# Patient Record
Sex: FEMALE | Race: OTHER | Employment: UNEMPLOYED | URBAN - METROPOLITAN AREA
[De-identification: names, ages, dates, MRNs, and addresses within clinical notes are randomized per-mention and may not be internally consistent; named-entity substitution may affect disease eponyms.]

---

## 2017-04-03 ENCOUNTER — OFFICE VISIT (OUTPATIENT)
Dept: INTERNAL MEDICINE CLINIC | Facility: CLINIC | Age: 62
End: 2017-04-03

## 2017-04-03 VITALS
SYSTOLIC BLOOD PRESSURE: 147 MMHG | DIASTOLIC BLOOD PRESSURE: 74 MMHG | HEART RATE: 73 BPM | WEIGHT: 135.69 LBS | TEMPERATURE: 99 F

## 2017-04-03 DIAGNOSIS — R18.8 CIRRHOSIS OF LIVER WITH ASCITES, UNSPECIFIED HEPATIC CIRRHOSIS TYPE (HCC): Primary | ICD-10-CM

## 2017-04-03 DIAGNOSIS — E55.9 VITAMIN D DEFICIENCY: ICD-10-CM

## 2017-04-03 DIAGNOSIS — E11.9 CONTROLLED TYPE 2 DIABETES MELLITUS WITHOUT COMPLICATION, WITHOUT LONG-TERM CURRENT USE OF INSULIN (HCC): ICD-10-CM

## 2017-04-03 DIAGNOSIS — K74.60 CIRRHOSIS OF LIVER WITH ASCITES, UNSPECIFIED HEPATIC CIRRHOSIS TYPE (HCC): Primary | ICD-10-CM

## 2017-04-03 PROCEDURE — 99204 OFFICE O/P NEW MOD 45 MIN: CPT | Performed by: INTERNAL MEDICINE

## 2017-04-03 PROCEDURE — 99212 OFFICE O/P EST SF 10 MIN: CPT | Performed by: INTERNAL MEDICINE

## 2017-04-03 RX ORDER — POLYETHYLENE GLYCOL 3350 17 G/17G
17 POWDER, FOR SOLUTION ORAL DAILY
COMMUNITY
End: 2017-05-16

## 2017-04-03 RX ORDER — FUROSEMIDE 20 MG/1
20 TABLET ORAL DAILY
COMMUNITY
End: 2017-04-11

## 2017-04-03 RX ORDER — SPIRONOLACTONE 25 MG/1
25 TABLET ORAL DAILY
COMMUNITY
End: 2017-04-11

## 2017-04-03 RX ORDER — CHOLECALCIFEROL (VITAMIN D3) 1250 MCG
CAPSULE ORAL
COMMUNITY
End: 2017-05-16 | Stop reason: ALTCHOICE

## 2017-04-03 NOTE — PROGRESS NOTES
HPI:    Patient ID: Kim Uribe is a 57year old female who arrives today with her daughter. The patient was seen at [facility] on 03/28/2017 for abdominal distention due to cirrhosis of the liver. PMHx includes Vitamin D Deficiency.    HPI  Diabetes well-developed. No distress. HENT:   Head: Normocephalic and atraumatic. Eyes: Conjunctivae are normal.   Neck: Normal range of motion. Carotid bruit is not present. Cardiovascular: Normal rate, regular rhythm and normal heart sounds.   Exam reveals n importance doing so. They verbalized agreement. - Differential Diagnosis: It is possible that the patient could have Primary Biliary Cirrhosis (PBC). Need to rule out ovarian carcinoma as a cause as well. - GASTRO - INTERNAL. An appointment was schedule 4/3/2017, 2:21 PM

## 2017-04-05 ENCOUNTER — TELEPHONE (OUTPATIENT)
Dept: INTERNAL MEDICINE CLINIC | Facility: CLINIC | Age: 62
End: 2017-04-05

## 2017-04-05 DIAGNOSIS — K74.60 CIRRHOSIS OF LIVER WITH ASCITES, UNSPECIFIED HEPATIC CIRRHOSIS TYPE (HCC): ICD-10-CM

## 2017-04-05 DIAGNOSIS — R18.8 CIRRHOSIS OF LIVER WITH ASCITES, UNSPECIFIED HEPATIC CIRRHOSIS TYPE (HCC): ICD-10-CM

## 2017-04-05 DIAGNOSIS — R18.8 OTHER ASCITES: Primary | ICD-10-CM

## 2017-04-06 NOTE — TELEPHONE ENCOUNTER
I spoke to Izabela Carrillo at Dr Lorri Whalen' office [phone]--OBI states Dr saw as inpatient, so transferred me to Med Records [phone]. Jaycob Parikh requests that I fax face sheet with info needed. This was done --fax [phone].  She states it will be sent to the

## 2017-04-06 NOTE — TELEPHONE ENCOUNTER
Called pt using language line, let pt's daughter know referral was placed to see Dr Rodrick Menjivar and for pt to follow up.

## 2017-04-06 NOTE — TELEPHONE ENCOUNTER
Please get care everywhere consent. I have never seen her before. Please schedule consult, next available.

## 2017-04-06 NOTE — TELEPHONE ENCOUNTER
I called Dr. Nathalie Corona and he discussed with me the findings of this patient and recommendations. I asked him if he can send the information in the notes from [place] to to us through the epic and he stated that his nerves going to be doing it today.

## 2017-04-07 ENCOUNTER — OFFICE VISIT (OUTPATIENT)
Dept: INTERNAL MEDICINE CLINIC | Facility: CLINIC | Age: 62
End: 2017-04-07

## 2017-04-07 VITALS
TEMPERATURE: 98 F | HEART RATE: 64 BPM | SYSTOLIC BLOOD PRESSURE: 155 MMHG | WEIGHT: 138 LBS | DIASTOLIC BLOOD PRESSURE: 71 MMHG

## 2017-04-07 DIAGNOSIS — R76.12 POSITIVE QUANTIFERON-TB GOLD TEST: ICD-10-CM

## 2017-04-07 DIAGNOSIS — K74.60 CIRRHOSIS OF LIVER WITH ASCITES, UNSPECIFIED HEPATIC CIRRHOSIS TYPE (HCC): Primary | ICD-10-CM

## 2017-04-07 DIAGNOSIS — R18.8 CIRRHOSIS OF LIVER WITH ASCITES, UNSPECIFIED HEPATIC CIRRHOSIS TYPE (HCC): Primary | ICD-10-CM

## 2017-04-07 PROCEDURE — 99214 OFFICE O/P EST MOD 30 MIN: CPT | Performed by: INTERNAL MEDICINE

## 2017-04-07 PROCEDURE — 99212 OFFICE O/P EST SF 10 MIN: CPT | Performed by: INTERNAL MEDICINE

## 2017-04-07 NOTE — PROGRESS NOTES
HPI:    Patient ID: Marilyn Munoz is a 57year old female who arrives today with her daughter. 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 HPI  Cirrhosis  This is a new problem.  The current episode started 1 to 4 week guarding. Musculoskeletal: Normal range of motion. She exhibits no edema (negative for edema in the bilateral lower extremities). Neurological: She is alert and oriented to person, place, and time. Skin: She is not diaphoretic.    Psychiatric: Her beh registration and records. Stressed the importance that the patient be evaluated by GASTRO with urgency, and to keep appointment date. They verbalized understanding.  - Not to take acetaminophen or NSAID for symptom relief.  Only 325 MG of acetaminophen if n

## 2017-04-11 ENCOUNTER — TELEPHONE (OUTPATIENT)
Dept: INTERNAL MEDICINE CLINIC | Facility: CLINIC | Age: 62
End: 2017-04-11

## 2017-04-11 ENCOUNTER — OFFICE VISIT (OUTPATIENT)
Dept: GASTROENTEROLOGY | Facility: CLINIC | Age: 62
End: 2017-04-11

## 2017-04-11 VITALS
HEART RATE: 64 BPM | SYSTOLIC BLOOD PRESSURE: 147 MMHG | BODY MASS INDEX: 26.5 KG/M2 | WEIGHT: 135 LBS | HEIGHT: 60 IN | DIASTOLIC BLOOD PRESSURE: 67 MMHG

## 2017-04-11 DIAGNOSIS — K76.9 CHRONIC LIVER DISEASE AND CIRRHOSIS (HCC): Primary | ICD-10-CM

## 2017-04-11 DIAGNOSIS — K74.60 CIRRHOSIS OF LIVER NOT DUE TO ALCOHOL (HCC): Primary | ICD-10-CM

## 2017-04-11 DIAGNOSIS — R76.12 POSITIVE QUANTIFERON-TB GOLD TEST: ICD-10-CM

## 2017-04-11 DIAGNOSIS — K65.2 SBP (SPONTANEOUS BACTERIAL PERITONITIS) (HCC): ICD-10-CM

## 2017-04-11 DIAGNOSIS — R05.9 COUGH: ICD-10-CM

## 2017-04-11 DIAGNOSIS — R76.11 POSITIVE TB TEST: ICD-10-CM

## 2017-04-11 DIAGNOSIS — K74.60 CHRONIC LIVER DISEASE AND CIRRHOSIS (HCC): Primary | ICD-10-CM

## 2017-04-11 PROCEDURE — 99244 OFF/OP CNSLTJ NEW/EST MOD 40: CPT | Performed by: INTERNAL MEDICINE

## 2017-04-11 PROCEDURE — 99212 OFFICE O/P EST SF 10 MIN: CPT | Performed by: INTERNAL MEDICINE

## 2017-04-11 RX ORDER — SPIRONOLACTONE 25 MG/1
25 TABLET ORAL DAILY
Qty: 1 TABLET | Refills: 2 | Status: SHIPPED | OUTPATIENT
Start: 2017-04-11 | End: 2017-04-14

## 2017-04-11 RX ORDER — FUROSEMIDE 20 MG/1
20 TABLET ORAL DAILY
Qty: 30 TABLET | Refills: 2 | Status: SHIPPED | OUTPATIENT
Start: 2017-04-11 | End: 2017-04-14

## 2017-04-11 NOTE — TELEPHONE ENCOUNTER
Daughter states that pt had an appt with Dr. Isaak Avery today and she was not able to help them due to pt needing a liver specialist. They were told for them to call her pcp to get a referral to see a liver specialist.  Pt would like to know if doctor can giv

## 2017-04-11 NOTE — PROGRESS NOTES
HPI:    Patient ID: Reina Valencia is a 57year old female. HPI    Review of Systems   Constitutional: Positive for fatigue. Negative for fever, chills, diaphoresis, activity change, appetite change and unexpected weight change.    HENT: Negative for place, and time. She appears well-developed and well-nourished. No distress. HENT:   Head: Normocephalic and atraumatic. Mouth/Throat: Oropharynx is clear and moist. No oropharyngeal exudate.    Eyes: Conjunctivae and EOM are normal. Pupils are equal, r Oral Tab 30 tablet 2      Sig: Take 1 tablet (20 mg total) by mouth daily.            Imaging & Referrals:  None       MB#[id number]

## 2017-04-11 NOTE — PATIENT INSTRUCTIONS
1.  You need to be referred to a liver transplant center disease (where your insurance is accepted). Possibly [address] at [facility]. 2.  Continue present medications including Aldactone and Lasix, low-salt diet    3.   You probably need a bi

## 2017-04-11 NOTE — H&P
History of present illness: This is a 75-year-old female referred from Dr. Aneta Cole of our group as well as from Psychiatric liver department, Dr. Dana Gloria.   I have spoken with Dr. Kei Antunez today to clarify this referral.  The patient was re daughter who is with her today states that new insurance starts May 1. She has no alarm signs or symptoms at this time.   After discussion with Dr. Hiral Larkin of [facility] it is clear that she needs a repeat paracentesis, possible peritoneal biopsy and a transpla

## 2017-04-13 NOTE — H&P
Patient should be referred to liver specialist, Dr. Howard Ferrara,  at [facility] within 1-2 months.

## 2017-04-14 ENCOUNTER — OFFICE VISIT (OUTPATIENT)
Dept: INTERNAL MEDICINE CLINIC | Facility: CLINIC | Age: 62
End: 2017-04-14

## 2017-04-14 VITALS
SYSTOLIC BLOOD PRESSURE: 144 MMHG | HEART RATE: 68 BPM | BODY MASS INDEX: 26 KG/M2 | DIASTOLIC BLOOD PRESSURE: 70 MMHG | TEMPERATURE: 98 F | WEIGHT: 134.38 LBS

## 2017-04-14 DIAGNOSIS — K65.2 SBP (SPONTANEOUS BACTERIAL PERITONITIS) (HCC): ICD-10-CM

## 2017-04-14 DIAGNOSIS — R76.12 POSITIVE QUANTIFERON-TB GOLD TEST: ICD-10-CM

## 2017-04-14 DIAGNOSIS — K74.60 LIVER DISEASE, CHRONIC, WITH CIRRHOSIS (HCC): Primary | ICD-10-CM

## 2017-04-14 DIAGNOSIS — K76.9 LIVER DISEASE, CHRONIC, WITH CIRRHOSIS (HCC): Primary | ICD-10-CM

## 2017-04-14 PROCEDURE — 99212 OFFICE O/P EST SF 10 MIN: CPT | Performed by: INTERNAL MEDICINE

## 2017-04-14 PROCEDURE — 99214 OFFICE O/P EST MOD 30 MIN: CPT | Performed by: INTERNAL MEDICINE

## 2017-04-14 RX ORDER — SPIRONOLACTONE 25 MG/1
25 TABLET ORAL DAILY
Qty: 30 TABLET | Refills: 6 | Status: SHIPPED | OUTPATIENT
Start: 2017-04-14 | End: 2017-05-16

## 2017-04-14 RX ORDER — FUROSEMIDE 20 MG/1
20 TABLET ORAL DAILY
Qty: 30 TABLET | Refills: 6 | Status: SHIPPED | OUTPATIENT
Start: 2017-04-14 | End: 2017-05-16

## 2017-04-14 NOTE — PROGRESS NOTES
HPI:    Patient ID: Ac Hull is a 57year old female. Patient was previously evaluated with cirrhosis at [facility], but could not extend any further treatment there due to insurance issues.  She was referred by me to RICHARD Dalton, who recommended Current Outpatient Prescriptions:  MetFORMIN HCl 500 MG Oral Tab Take 500 mg by mouth 2 (two) times daily with meals. Disp:  Rfl:    spironolactone 25 MG Oral Tab Take 1 tablet (25 mg total) by mouth daily.  Disp: 1 tablet Rfl: 2   furo noticed on today's exam.   Plan:  I discussed with patient that I will refer her to external transplant specialist; however, patient will have her new insurance starting May 1st. At this time, we do not know yet what network her new insurance will cover, a Electronically Signed: Harini Wang, 4/14/2017, 8:07 AM.      I, Marilu Roberts MD,  personally performed the services described in this documentation. All medical record entries made by the scribe were at my direction and in my presence.   I have reviewed t

## 2017-05-01 ENCOUNTER — TELEPHONE (OUTPATIENT)
Dept: PULMONOLOGY | Facility: CLINIC | Age: 62
End: 2017-05-01

## 2017-05-01 NOTE — TELEPHONE ENCOUNTER
Pts daughter asking for pt to be seen sooner than next available, states PCP needs pt to see pulmonary doctor first before he can refer pt to a doctor to treat her liver cirrhosis. Pls advise thank you. French speaker.

## 2017-05-01 NOTE — TELEPHONE ENCOUNTER
Attempted to contact patient via interpretor, unable to leave voicemail. Will try again later today. Per Dr. Tam Hankins note patient is positive Quantifieron TB, had x-rays done at [facility] that she will need to get.  Can try to get patient in with Dr. Leda Kenney

## 2017-05-02 ENCOUNTER — TELEPHONE (OUTPATIENT)
Dept: GASTROENTEROLOGY | Facility: CLINIC | Age: 62
End: 2017-05-02

## 2017-05-02 NOTE — TELEPHONE ENCOUNTER
Per Dr. Yumiko Gaston, pt.'s new insurance is in her chart as [facility] with ID no.  [id number]; this is confirmed with Hugo Forrester in managed care and the pt will not need referrals with this insurance, but she needs to see providers in network; she checked if

## 2017-05-02 NOTE — TELEPHONE ENCOUNTER
Per Kathy Mcintosh RN from  please assist pt per Dr. Gudelia Quintana to see pulmonary MD asap for positive quantiferon gold, pt will be returning from [facility] Saturday 5/6.  Pt daughter Collins Vaz contacted via Michae Schilder #[id number] appt set up with MM 5/15 @ 10 am in [place]

## 2017-05-02 NOTE — TELEPHONE ENCOUNTER
Dr. Rylee Sparks' office is contacted- spoke with Farhat Sanchez; appt is booked for 5/10/17 at 1400; pt will get phone call to confirm appt on Monday; referral needs to be faxed to [phone].

## 2017-05-02 NOTE — TELEPHONE ENCOUNTER
Please send letter via certified mail. [place] coordinate a referral to a tertiary center that except her insurance for cirrhosis, and also possible peritoneal biopsy to rule out peritoneal tuberculosis.

## 2017-05-02 NOTE — TELEPHONE ENCOUNTER
Due to  not being able to dial out, pt.'s daughter is contacted by myself and, in [address], she is made aware of appt with Dr. Lexi Bishop on 5/10/17 at 1400; she is agreeable with this; she is made aware of phone call on Monday from [facility] to confirm ap

## 2017-05-08 NOTE — TELEPHONE ENCOUNTER
Contacted Dr. Edenilson Kent office at [facility] and confirmed pt appt on 5/10/17 @ [facility] states that they already have the pt's insurance information and that they DO accept it.     I contacted pt daughter, Jesus Settler, and she wanted to know if she has 3 appts sc

## 2017-05-08 NOTE — TELEPHONE ENCOUNTER
Pts daughter would like clarification on 5/2 letter. LIBERTY was told by RN she would cb after calling Dr. Terry Godinez office, pts daughter was informed.  Wolof speaker

## 2017-05-16 ENCOUNTER — OFFICE VISIT (OUTPATIENT)
Dept: INTERNAL MEDICINE CLINIC | Facility: CLINIC | Age: 62
End: 2017-05-16

## 2017-05-16 VITALS
TEMPERATURE: 98 F | WEIGHT: 129.13 LBS | SYSTOLIC BLOOD PRESSURE: 143 MMHG | HEART RATE: 59 BPM | DIASTOLIC BLOOD PRESSURE: 70 MMHG | BODY MASS INDEX: 25 KG/M2

## 2017-05-16 DIAGNOSIS — R18.8 CIRRHOSIS OF LIVER WITH ASCITES, UNSPECIFIED HEPATIC CIRRHOSIS TYPE (HCC): Primary | ICD-10-CM

## 2017-05-16 DIAGNOSIS — E11.9 CONTROLLED TYPE 2 DIABETES MELLITUS WITHOUT COMPLICATION, WITHOUT LONG-TERM CURRENT USE OF INSULIN (HCC): ICD-10-CM

## 2017-05-16 DIAGNOSIS — K74.60 CIRRHOSIS OF LIVER WITH ASCITES, UNSPECIFIED HEPATIC CIRRHOSIS TYPE (HCC): Primary | ICD-10-CM

## 2017-05-16 PROCEDURE — 99212 OFFICE O/P EST SF 10 MIN: CPT | Performed by: INTERNAL MEDICINE

## 2017-05-16 PROCEDURE — 99214 OFFICE O/P EST MOD 30 MIN: CPT | Performed by: INTERNAL MEDICINE

## 2017-05-16 RX ORDER — SPIRONOLACTONE 25 MG/1
25 TABLET ORAL DAILY
Qty: 30 TABLET | Refills: 6 | Status: SHIPPED | OUTPATIENT
Start: 2017-05-16 | End: 2017-07-24

## 2017-05-16 RX ORDER — FUROSEMIDE 20 MG/1
20 TABLET ORAL DAILY
Qty: 30 TABLET | Refills: 6 | Status: SHIPPED | OUTPATIENT
Start: 2017-05-16 | End: 2018-04-05 | Stop reason: DRUGHIGH

## 2017-05-16 NOTE — PROGRESS NOTES
HPI:    Patient ID: Bulmaro Florian is a 57year old female. HPI  Cirrhosis  This is a new problem. Episode onset: Less than 2 months ago. The problem occurs constantly. Progression since onset: stable.  Pertinent negatives include no abdominal pain, PHYSICAL EXAM:   Physical Exam   Constitutional: She is oriented to person, place, and time. She appears well-developed. No distress. HENT:   Head: Normocephalic and atraumatic. Eyes: Conjunctivae are normal.   Neck: Normal range of motion.    Cardiov up with Dr. Fanny Jara as scheduled. - GASTRO - EXTERNAL        (E11.9) Controlled type 2 diabetes mellitus without complication, without long-term current use of insulin (HCC)  PMHx includes chronic Diabetes Mellitus Type II.  Current treatment includes MetF

## 2017-05-17 NOTE — TELEPHONE ENCOUNTER
Yesterday  gave patient a meter and told patient that  was going to prescribe the test strips for that meter but pharmacy has't rec'd the rx. Pharmacy information has been verified.      Daughter states she will call back later today with the name of

## 2017-05-23 NOTE — TELEPHONE ENCOUNTER
Language line # [phone]   Assisted with call. Pt was not home and daughter does not know where the meter is. She will check this with her mother and call us back with the meter name.

## 2017-05-30 RX ORDER — SYRING-NEEDL,DISP,INSUL,0.3 ML 30 GX5/16"
SYRINGE, EMPTY DISPOSABLE MISCELLANEOUS
Qty: 100 EACH | Refills: 3 | Status: SHIPPED | OUTPATIENT
Start: 2017-05-30 | End: 2017-09-26

## 2017-07-24 ENCOUNTER — OFFICE VISIT (OUTPATIENT)
Dept: INTERNAL MEDICINE CLINIC | Facility: CLINIC | Age: 62
End: 2017-07-24

## 2017-07-24 VITALS
WEIGHT: 130 LBS | DIASTOLIC BLOOD PRESSURE: 65 MMHG | HEIGHT: 60 IN | BODY MASS INDEX: 25.52 KG/M2 | SYSTOLIC BLOOD PRESSURE: 149 MMHG | HEART RATE: 67 BPM

## 2017-07-24 DIAGNOSIS — R76.12 POSITIVE QUANTIFERON-TB GOLD TEST: ICD-10-CM

## 2017-07-24 DIAGNOSIS — E11.9 CONTROLLED TYPE 2 DIABETES MELLITUS WITHOUT COMPLICATION, WITHOUT LONG-TERM CURRENT USE OF INSULIN (HCC): Primary | ICD-10-CM

## 2017-07-24 DIAGNOSIS — L84 CALLUS OF FOOT: ICD-10-CM

## 2017-07-24 DIAGNOSIS — R18.8 CIRRHOSIS OF LIVER WITH ASCITES, UNSPECIFIED HEPATIC CIRRHOSIS TYPE (HCC): ICD-10-CM

## 2017-07-24 DIAGNOSIS — K74.60 CIRRHOSIS OF LIVER WITH ASCITES, UNSPECIFIED HEPATIC CIRRHOSIS TYPE (HCC): ICD-10-CM

## 2017-07-24 PROCEDURE — 99214 OFFICE O/P EST MOD 30 MIN: CPT | Performed by: INTERNAL MEDICINE

## 2017-07-24 PROCEDURE — 99212 OFFICE O/P EST SF 10 MIN: CPT | Performed by: INTERNAL MEDICINE

## 2017-07-24 RX ORDER — SPIRONOLACTONE 25 MG/1
25 TABLET ORAL DAILY
Qty: 30 TABLET | Refills: 6 | Status: SHIPPED | OUTPATIENT
Start: 2017-07-24 | End: 2018-04-16 | Stop reason: ALTCHOICE

## 2017-07-24 NOTE — PROGRESS NOTES
HPI:    Patient ID: Hector Tuttle is a 57year old female. Diabetes   She presents for her follow-up diabetic visit. She has type 2 diabetes mellitus. Pertinent negatives for hypoglycemia include no speech difficulty.  Pertinent negatives for diabet day.  As instructed. Disp: 100 each Rfl: 3   Lancet Device Does not apply Misc To use as instructed 3 times a day. Disp: 100 each Rfl: 3   furosemide 20 MG Oral Tab Take 1 tablet (20 mg total) by mouth daily.  Disp: 30 tablet Rfl: 6     Allergies:No Known A LIPID PANEL, COMP METABOLIC         PANEL (14), MICROALB/CREAT RATIO, RANDOM URINE ordered   -Refill for metformin hcl 500  Mg prescribed      (K74.60) Cirrhosis of liver with ascites, unspecified hepatic cirrhosis type ([facility] [address]  - The patient denies current reviewed the chart and discharge instructions (if applicable) and agree that the record reflects my personal performance and is accurate and complete.   Helga Guerrero MD, 7/24/2017, 10:00 AM

## 2017-09-28 RX ORDER — LANCETS
EACH MISCELLANEOUS
Qty: 100 EACH | Refills: 3 | Status: SHIPPED | OUTPATIENT
Start: 2017-09-28 | End: 2018-02-04

## 2017-09-28 NOTE — TELEPHONE ENCOUNTER
Supplies sent. Has yet to do labs.   Diabetes Medications  Protocol Criteria:  · Appointment scheduled in the past 6 months or the next 3 months  · A1C < 7.5 in the past 6 months  · Creatinine in the past 12 months  · Creatinine result < 1.5   Recent Outpa

## 2017-10-13 ENCOUNTER — APPOINTMENT (OUTPATIENT)
Dept: LAB | Facility: HOSPITAL | Age: 62
End: 2017-10-13
Attending: INTERNAL MEDICINE
Payer: MEDICAID

## 2017-10-13 DIAGNOSIS — K74.60 CIRRHOSIS OF LIVER WITH ASCITES, UNSPECIFIED HEPATIC CIRRHOSIS TYPE (HCC): ICD-10-CM

## 2017-10-13 DIAGNOSIS — E11.9 CONTROLLED TYPE 2 DIABETES MELLITUS WITHOUT COMPLICATION, WITHOUT LONG-TERM CURRENT USE OF INSULIN (HCC): ICD-10-CM

## 2017-10-13 DIAGNOSIS — R18.8 CIRRHOSIS OF LIVER WITH ASCITES, UNSPECIFIED HEPATIC CIRRHOSIS TYPE (HCC): ICD-10-CM

## 2017-10-13 PROCEDURE — 80061 LIPID PANEL: CPT

## 2017-10-13 PROCEDURE — 80053 COMPREHEN METABOLIC PANEL: CPT

## 2017-10-13 PROCEDURE — 83036 HEMOGLOBIN GLYCOSYLATED A1C: CPT

## 2017-10-13 PROCEDURE — 82043 UR ALBUMIN QUANTITATIVE: CPT

## 2017-10-13 PROCEDURE — 36415 COLL VENOUS BLD VENIPUNCTURE: CPT

## 2017-10-13 PROCEDURE — 82570 ASSAY OF URINE CREATININE: CPT

## 2017-10-30 ENCOUNTER — OFFICE VISIT (OUTPATIENT)
Dept: FAMILY MEDICINE CLINIC | Facility: CLINIC | Age: 62
End: 2017-10-30

## 2017-10-30 VITALS
BODY MASS INDEX: 24 KG/M2 | DIASTOLIC BLOOD PRESSURE: 68 MMHG | SYSTOLIC BLOOD PRESSURE: 132 MMHG | HEART RATE: 66 BPM | WEIGHT: 121.19 LBS

## 2017-10-30 DIAGNOSIS — R76.11 POSITIVE TB TEST: ICD-10-CM

## 2017-10-30 DIAGNOSIS — K74.60 CIRRHOSIS OF LIVER NOT DUE TO ALCOHOL (HCC): Primary | ICD-10-CM

## 2017-10-30 DIAGNOSIS — E11.9 TYPE 2 DIABETES MELLITUS WITHOUT COMPLICATION, WITHOUT LONG-TERM CURRENT USE OF INSULIN (HCC): ICD-10-CM

## 2017-10-30 PROCEDURE — 99212 OFFICE O/P EST SF 10 MIN: CPT | Performed by: FAMILY MEDICINE

## 2017-10-30 PROCEDURE — 99203 OFFICE O/P NEW LOW 30 MIN: CPT | Performed by: FAMILY MEDICINE

## 2017-10-30 RX ORDER — PANTOPRAZOLE SODIUM 40 MG/1
40 TABLET, DELAYED RELEASE ORAL DAILY
COMMUNITY
Start: 2017-10-11

## 2017-10-30 RX ORDER — ONDANSETRON 4 MG/1
4 TABLET, FILM COATED ORAL AS NEEDED
COMMUNITY
Start: 2017-10-12 | End: 2017-11-11

## 2017-10-30 RX ORDER — POLYETHYLENE GLYCOL 3350 17 G/17G
17 POWDER, FOR SOLUTION ORAL DAILY
COMMUNITY
Start: 2017-10-11 | End: 2017-11-10

## 2017-10-30 NOTE — PROGRESS NOTES
Meryl Mcarthur is a 57year old female. Patient presents with:  Diabetes    HPI:   Pt here for regular first visit with me. Reports being on diabetes medications for about 6 years. Was taking medications from [facility] before that.  Reports glucose is [place] or tenderness  EXTREMITIES: no cyanosis, clubbing or edema    ASSESSMENT AND PLAN:   1. Cirrhosis of liver not due to alcohol ([facility] [address]  Referrals placed  - HEPATOLOGY - INTERNAL  - HEMOGLOBIN A1C; Future    2.  Type 2 diabetes mellitus without complication, wi

## 2018-01-01 ENCOUNTER — OFFICE VISIT (OUTPATIENT)
Dept: PHYSICAL THERAPY | Facility: HOSPITAL | Age: 63
End: 2018-01-01
Attending: Other
Payer: MEDICAID

## 2018-01-01 ENCOUNTER — TELEPHONE (OUTPATIENT)
Dept: OTHER | Age: 63
End: 2018-01-01

## 2018-01-01 ENCOUNTER — HOSPITAL ENCOUNTER (OUTPATIENT)
Dept: RESPIRATORY THERAPY | Facility: HOSPITAL | Age: 63
Discharge: HOME | End: 2018-01-01
Attending: INTERNAL MEDICINE
Payer: MEDICAID

## 2018-01-01 ENCOUNTER — OFFICE VISIT (OUTPATIENT)
Dept: SPEECH THERAPY | Facility: HOSPITAL | Age: 63
End: 2018-01-01
Attending: Other
Payer: MEDICAID

## 2018-01-01 ENCOUNTER — APPOINTMENT (OUTPATIENT)
Dept: LAB | Age: 63
End: 2018-01-01
Attending: INTERNAL MEDICINE
Payer: MEDICAID

## 2018-01-01 ENCOUNTER — TELEPHONE (OUTPATIENT)
Dept: FAMILY MEDICINE CLINIC | Facility: CLINIC | Age: 63
End: 2018-01-01

## 2018-01-01 ENCOUNTER — TELEPHONE (OUTPATIENT)
Dept: PULMONOLOGY | Facility: CLINIC | Age: 63
End: 2018-01-01

## 2018-01-01 ENCOUNTER — OFFICE VISIT (OUTPATIENT)
Dept: NEUROLOGY | Facility: CLINIC | Age: 63
End: 2018-01-01
Payer: MEDICAID

## 2018-01-01 ENCOUNTER — OFFICE VISIT (OUTPATIENT)
Dept: PULMONOLOGY | Facility: CLINIC | Age: 63
End: 2018-01-01
Payer: MEDICAID

## 2018-01-01 ENCOUNTER — OFFICE VISIT (OUTPATIENT)
Dept: FAMILY MEDICINE CLINIC | Facility: CLINIC | Age: 63
End: 2018-01-01
Payer: MEDICAID

## 2018-01-01 VITALS
HEIGHT: 60 IN | SYSTOLIC BLOOD PRESSURE: 130 MMHG | BODY MASS INDEX: 25.91 KG/M2 | DIASTOLIC BLOOD PRESSURE: 69 MMHG | WEIGHT: 132 LBS | OXYGEN SATURATION: 97 % | HEART RATE: 68 BPM

## 2018-01-01 VITALS
WEIGHT: 132 LBS | HEIGHT: 60 IN | BODY MASS INDEX: 25.91 KG/M2 | SYSTOLIC BLOOD PRESSURE: 148 MMHG | DIASTOLIC BLOOD PRESSURE: 67 MMHG | HEART RATE: 65 BPM | TEMPERATURE: 98 F

## 2018-01-01 VITALS
TEMPERATURE: 98 F | SYSTOLIC BLOOD PRESSURE: 157 MMHG | DIASTOLIC BLOOD PRESSURE: 68 MMHG | HEART RATE: 60 BPM | BODY MASS INDEX: 26 KG/M2 | WEIGHT: 132 LBS

## 2018-01-01 VITALS
HEART RATE: 62 BPM | DIASTOLIC BLOOD PRESSURE: 58 MMHG | HEIGHT: 60 IN | WEIGHT: 132 LBS | SYSTOLIC BLOOD PRESSURE: 136 MMHG | BODY MASS INDEX: 25.91 KG/M2

## 2018-01-01 DIAGNOSIS — E11.9 CONTROLLED TYPE 2 DIABETES MELLITUS WITHOUT COMPLICATION, WITHOUT LONG-TERM CURRENT USE OF INSULIN (HCC): ICD-10-CM

## 2018-01-01 DIAGNOSIS — A08.4 VIRAL GASTROENTERITIS: ICD-10-CM

## 2018-01-01 DIAGNOSIS — R27.0 ATAXIA: ICD-10-CM

## 2018-01-01 DIAGNOSIS — R05.9 COUGH: ICD-10-CM

## 2018-01-01 DIAGNOSIS — R05.9 COUGH: Primary | ICD-10-CM

## 2018-01-01 DIAGNOSIS — R29.810 FACIAL WEAKNESS: ICD-10-CM

## 2018-01-01 DIAGNOSIS — K72.10 CHRONIC LIVER FAILURE WITHOUT HEPATIC COMA (HCC): ICD-10-CM

## 2018-01-01 DIAGNOSIS — I10 ESSENTIAL HYPERTENSION: ICD-10-CM

## 2018-01-01 DIAGNOSIS — G25.81 RLS (RESTLESS LEGS SYNDROME): ICD-10-CM

## 2018-01-01 DIAGNOSIS — R13.10 DYSPHAGIA, UNSPECIFIED TYPE: ICD-10-CM

## 2018-01-01 DIAGNOSIS — R05.3 CHRONIC COUGH: Primary | ICD-10-CM

## 2018-01-01 DIAGNOSIS — J43.9 PULMONARY EMPHYSEMA, UNSPECIFIED EMPHYSEMA TYPE (HCC): ICD-10-CM

## 2018-01-01 DIAGNOSIS — K74.60 CIRRHOSIS OF LIVER WITHOUT ASCITES, UNSPECIFIED HEPATIC CIRRHOSIS TYPE (HCC): ICD-10-CM

## 2018-01-01 DIAGNOSIS — G20 PARKINSON DISEASE (HCC): Primary | ICD-10-CM

## 2018-01-01 DIAGNOSIS — E11.9 TYPE 2 DIABETES MELLITUS WITHOUT COMPLICATION, WITHOUT LONG-TERM CURRENT USE OF INSULIN (HCC): ICD-10-CM

## 2018-01-01 DIAGNOSIS — R05.3 CHRONIC COUGH: ICD-10-CM

## 2018-01-01 DIAGNOSIS — K74.60 CIRRHOSIS OF LIVER WITHOUT ASCITES, UNSPECIFIED HEPATIC CIRRHOSIS TYPE (HCC): Primary | ICD-10-CM

## 2018-01-01 DIAGNOSIS — A08.4 VIRAL GASTROENTERITIS: Primary | ICD-10-CM

## 2018-01-01 DIAGNOSIS — S16.1XXA STRAIN OF NECK MUSCLE, INITIAL ENCOUNTER: ICD-10-CM

## 2018-01-01 DIAGNOSIS — J06.9 VIRAL UPPER RESPIRATORY TRACT INFECTION: ICD-10-CM

## 2018-01-01 DIAGNOSIS — G47.61 PLMD (PERIODIC LIMB MOVEMENT DISORDER): ICD-10-CM

## 2018-01-01 PROCEDURE — 94726 PLETHYSMOGRAPHY LUNG VOLUMES: CPT | Performed by: INTERNAL MEDICINE

## 2018-01-01 PROCEDURE — 99213 OFFICE O/P EST LOW 20 MIN: CPT | Performed by: NURSE PRACTITIONER

## 2018-01-01 PROCEDURE — 97112 NEUROMUSCULAR REEDUCATION: CPT

## 2018-01-01 PROCEDURE — 97110 THERAPEUTIC EXERCISES: CPT

## 2018-01-01 PROCEDURE — 92526 ORAL FUNCTION THERAPY: CPT

## 2018-01-01 PROCEDURE — 92507 TX SP LANG VOICE COMM INDIV: CPT

## 2018-01-01 PROCEDURE — 99212 OFFICE O/P EST SF 10 MIN: CPT | Performed by: INTERNAL MEDICINE

## 2018-01-01 PROCEDURE — 97162 PT EVAL MOD COMPLEX 30 MIN: CPT

## 2018-01-01 PROCEDURE — 94729 DIFFUSING CAPACITY: CPT | Performed by: INTERNAL MEDICINE

## 2018-01-01 PROCEDURE — 99214 OFFICE O/P EST MOD 30 MIN: CPT | Performed by: OTHER

## 2018-01-01 PROCEDURE — 94060 EVALUATION OF WHEEZING: CPT | Performed by: INTERNAL MEDICINE

## 2018-01-01 PROCEDURE — 82140 ASSAY OF AMMONIA: CPT

## 2018-01-01 PROCEDURE — 80053 COMPREHEN METABOLIC PANEL: CPT

## 2018-01-01 PROCEDURE — 85652 RBC SED RATE AUTOMATED: CPT

## 2018-01-01 PROCEDURE — 36415 COLL VENOUS BLD VENIPUNCTURE: CPT

## 2018-01-01 PROCEDURE — 99214 OFFICE O/P EST MOD 30 MIN: CPT | Performed by: INTERNAL MEDICINE

## 2018-01-01 PROCEDURE — 99215 OFFICE O/P EST HI 40 MIN: CPT | Performed by: FAMILY MEDICINE

## 2018-01-01 PROCEDURE — 99212 OFFICE O/P EST SF 10 MIN: CPT | Performed by: FAMILY MEDICINE

## 2018-01-01 RX ORDER — ONDANSETRON 4 MG/1
4 TABLET, FILM COATED ORAL EVERY 8 HOURS PRN
Qty: 30 TABLET | Refills: 0 | Status: SHIPPED | OUTPATIENT
Start: 2018-01-01 | End: 2018-01-01

## 2018-01-01 RX ORDER — ROPINIROLE 1 MG/1
TABLET, FILM COATED ORAL
Qty: 60 TABLET | Refills: 5 | OUTPATIENT
Start: 2018-01-01

## 2018-01-01 RX ORDER — LISINOPRIL 10 MG/1
10 TABLET ORAL DAILY
Qty: 90 TABLET | Refills: 0 | Status: SHIPPED | OUTPATIENT
Start: 2018-01-01 | End: 2018-01-01 | Stop reason: ALTCHOICE

## 2018-01-01 RX ORDER — ALENDRONATE SODIUM 70 MG/1
70 TABLET ORAL WEEKLY
Qty: 12 TABLET | Refills: 0 | Status: SHIPPED | OUTPATIENT
Start: 2018-01-01

## 2018-01-01 RX ORDER — FUROSEMIDE 40 MG/1
40 TABLET ORAL DAILY
Qty: 30 TABLET | Refills: 2 | Status: SHIPPED | OUTPATIENT
Start: 2018-01-01

## 2018-01-01 RX ORDER — FLUTICASONE PROPIONATE AND SALMETEROL 113; 14 UG/1; UG/1
1 POWDER, METERED RESPIRATORY (INHALATION) 2 TIMES DAILY
Qty: 1 EACH | Refills: 3 | OUTPATIENT
Start: 2018-01-01

## 2018-01-01 RX ORDER — VERAPAMIL HYDROCHLORIDE 180 MG/1
180 CAPSULE, EXTENDED RELEASE ORAL NIGHTLY
Qty: 90 CAPSULE | Refills: 3 | Status: SHIPPED | OUTPATIENT
Start: 2018-01-01 | End: 2019-01-01

## 2018-01-01 RX ORDER — ROPINIROLE 0.25 MG/1
TABLET, FILM COATED ORAL
Qty: 90 TABLET | Refills: 0 | OUTPATIENT
Start: 2018-01-01

## 2018-01-01 RX ORDER — AZELASTINE HYDROCHLORIDE 0.5 MG/ML
SOLUTION/ DROPS OPHTHALMIC
Qty: 6 ML | Refills: 0 | Status: SHIPPED | OUTPATIENT
Start: 2018-01-01

## 2018-01-01 RX ORDER — FUROSEMIDE 40 MG/1
40 TABLET ORAL DAILY
Qty: 30 TABLET | Refills: 0 | OUTPATIENT
Start: 2018-01-01

## 2018-01-01 RX ORDER — ROPINIROLE 1 MG/1
1 TABLET, FILM COATED ORAL 3 TIMES DAILY
Qty: 270 TABLET | Refills: 1 | Status: SHIPPED | OUTPATIENT
Start: 2018-01-01 | End: 2019-01-01

## 2018-01-01 RX ORDER — FLUTICASONE PROPIONATE AND SALMETEROL 113; 14 UG/1; UG/1
1 POWDER, METERED RESPIRATORY (INHALATION) 2 TIMES DAILY
Qty: 1 EACH | Refills: 3 | Status: SHIPPED | OUTPATIENT
Start: 2018-01-01 | End: 2019-01-01

## 2018-01-01 RX ORDER — ALENDRONATE SODIUM 70 MG/1
70 TABLET ORAL WEEKLY
Qty: 12 TABLET | Refills: 0 | OUTPATIENT
Start: 2018-01-01

## 2018-01-01 RX ORDER — LOSARTAN POTASSIUM 50 MG/1
50 TABLET ORAL DAILY
Qty: 90 TABLET | Refills: 0 | Status: SHIPPED | OUTPATIENT
Start: 2018-01-01 | End: 2018-01-01

## 2018-01-01 RX ORDER — METOCLOPRAMIDE 5 MG/1
5 TABLET ORAL
Qty: 90 TABLET | Refills: 5 | Status: SHIPPED | OUTPATIENT
Start: 2018-01-01 | End: 2019-01-01

## 2018-01-26 ENCOUNTER — TELEPHONE (OUTPATIENT)
Dept: GASTROENTEROLOGY | Facility: CLINIC | Age: 63
End: 2018-01-26

## 2018-01-26 NOTE — TELEPHONE ENCOUNTER
Requesting to be seen sooner then 1st avail. for hosp f/up for Cirrhosis. Pt.was seen at [facility] on Sunday and was discharged today 1/26.

## 2018-01-27 NOTE — TELEPHONE ENCOUNTER
I just called the pt to make an appt with our office. She states she was told to follow up with her primary care physician. She is going to call Dr Allan Grissom and clarify which doctor she is supposed to f/u with.  She is confused now    She will call back

## 2018-02-04 RX ORDER — LANCETS
EACH MISCELLANEOUS
Qty: 100 EACH | Refills: 2 | Status: SHIPPED | OUTPATIENT
Start: 2018-02-04 | End: 2018-06-21

## 2018-02-04 NOTE — TELEPHONE ENCOUNTER
Refilled per protocol.   Diabetes Medications  Protocol Criteria:  · Appointment scheduled in the past 6 months or the next 3 months  · A1C < 7.5 in the past 6 months  · Creatinine in the past 12 months  · Creatinine result < 1.5   Recent Outpatient Visits

## 2018-02-04 NOTE — TELEPHONE ENCOUNTER
Refilled per protocol.    Refill Protocol Appointment Criteria  · Appointment scheduled in the past 12 months or in the next 3 months  Recent Outpatient Visits            3 months ago Cirrhosis of liver not due to alcohol [facility])    [address]

## 2018-02-08 ENCOUNTER — OFFICE VISIT (OUTPATIENT)
Dept: FAMILY MEDICINE CLINIC | Facility: CLINIC | Age: 63
End: 2018-02-08

## 2018-02-08 VITALS
BODY MASS INDEX: 25.52 KG/M2 | SYSTOLIC BLOOD PRESSURE: 145 MMHG | DIASTOLIC BLOOD PRESSURE: 64 MMHG | HEART RATE: 63 BPM | HEIGHT: 60 IN | TEMPERATURE: 99 F | WEIGHT: 130 LBS

## 2018-02-08 DIAGNOSIS — E11.9 TYPE 2 DIABETES MELLITUS WITHOUT COMPLICATION, WITHOUT LONG-TERM CURRENT USE OF INSULIN (HCC): Primary | ICD-10-CM

## 2018-02-08 DIAGNOSIS — K74.60 CIRRHOSIS OF LIVER NOT DUE TO ALCOHOL (HCC): ICD-10-CM

## 2018-02-08 DIAGNOSIS — E87.1 HYPONATREMIA: ICD-10-CM

## 2018-02-08 DIAGNOSIS — H10.9 CONJUNCTIVITIS OF BOTH EYES, UNSPECIFIED CONJUNCTIVITIS TYPE: ICD-10-CM

## 2018-02-08 PROCEDURE — 99213 OFFICE O/P EST LOW 20 MIN: CPT | Performed by: FAMILY MEDICINE

## 2018-02-08 PROCEDURE — 99212 OFFICE O/P EST SF 10 MIN: CPT | Performed by: FAMILY MEDICINE

## 2018-02-08 RX ORDER — METOCLOPRAMIDE 5 MG/1
5 TABLET ORAL
COMMUNITY
Start: 2018-02-07 | End: 2018-09-08

## 2018-02-08 RX ORDER — ONDANSETRON 4 MG/1
4 TABLET, FILM COATED ORAL
COMMUNITY
Start: 2017-11-13 | End: 2018-01-01

## 2018-02-08 RX ORDER — LACTULOSE 10 G/15ML
10 SOLUTION ORAL
COMMUNITY
Start: 2018-02-07

## 2018-02-08 RX ORDER — AZELASTINE HYDROCHLORIDE 0.5 MG/ML
1 SOLUTION/ DROPS OPHTHALMIC 2 TIMES DAILY
Qty: 1 BOTTLE | Refills: 0 | Status: SHIPPED | OUTPATIENT
Start: 2018-02-08 | End: 2018-04-16 | Stop reason: ALTCHOICE

## 2018-02-08 NOTE — PROGRESS NOTES
Leanna Gupta is a 70year old female. Patient presents with:  Hospital F/U: liver disease  ,patient denies pain     HPI:   Was at [place] [address] - hospitalized for confusion and hyponatremia.  Per daughter they increased the lasix to 40 and stopped t lesions  HEENT: atraumatic, normocephalic,ears and throat are clear  NECK: supple,no adenopathy,no bruits  LUNGS: clear to auscultation  CARDIO: RRR without murmur  GI: good BS's,no masses,positive HSM or tenderness  EXTREMITIES: no cyanosis, clubbing or e

## 2018-03-01 ENCOUNTER — TELEPHONE (OUTPATIENT)
Dept: FAMILY MEDICINE CLINIC | Facility: CLINIC | Age: 63
End: 2018-03-01

## 2018-03-01 DIAGNOSIS — Z12.31 BREAST CANCER SCREENING BY MAMMOGRAM: Primary | ICD-10-CM

## 2018-03-08 ENCOUNTER — TELEPHONE (OUTPATIENT)
Dept: CASE MANAGEMENT | Age: 63
End: 2018-03-08

## 2018-03-08 ENCOUNTER — TELEPHONE (OUTPATIENT)
Dept: FAMILY MEDICINE CLINIC | Facility: CLINIC | Age: 63
End: 2018-03-08

## 2018-03-08 NOTE — TELEPHONE ENCOUNTER
PA for [facility] Next Test blood glucose test strips completed with [employer] via CMM response time 3-5 business days KEY [id number].

## 2018-03-08 NOTE — TELEPHONE ENCOUNTER
Spoke to daughter Anshu Umana, she stated she is the one that makes appts for patient I informed her that patient is due for Mammogram and gave her Central Scheduling phone number ext. [phone]. Thank you.

## 2018-03-16 NOTE — TELEPHONE ENCOUNTER
PA denied. Plan states patient must be taking an oral diabetic medication and/or insulin, HbA1C must be 5.7 or more, two fasting plasma glucose test more than or equal to 100 mg/dl.

## 2018-03-20 NOTE — TELEPHONE ENCOUNTER
Rey Bird (daughter) stated patient is in [facility] and will return in [address] [place] [address]. Patient stated she will have patient call at her return.

## 2018-04-04 DIAGNOSIS — K74.60 CIRRHOSIS OF LIVER WITH ASCITES, UNSPECIFIED HEPATIC CIRRHOSIS TYPE: ICD-10-CM

## 2018-04-04 DIAGNOSIS — R18.8 CIRRHOSIS OF LIVER WITH ASCITES, UNSPECIFIED HEPATIC CIRRHOSIS TYPE: ICD-10-CM

## 2018-04-05 RX ORDER — FUROSEMIDE 40 MG/1
40 TABLET ORAL DAILY
Qty: 30 TABLET | Refills: 0 | Status: SHIPPED | OUTPATIENT
Start: 2018-04-05 | End: 2018-06-21

## 2018-04-05 NOTE — TELEPHONE ENCOUNTER
Pt informed of MD message, pt stated she takes lasix 40 mg qd.   pls advise, thanks in advance. rx pended.          Future Appointments  Date Time Provider Cyndie Huang   4/12/2018 8:00 AM ADO REG RM1 ADO REG EM [place]

## 2018-04-05 NOTE — TELEPHONE ENCOUNTER
Per last notes, sidney hepatologist increased her dose to lasix 40 mg daily.  Please clarify with patient and family

## 2018-04-06 RX ORDER — FUROSEMIDE 20 MG/1
TABLET ORAL
Qty: 30 TABLET | Refills: 0 | OUTPATIENT
Start: 2018-04-06

## 2018-04-16 ENCOUNTER — OFFICE VISIT (OUTPATIENT)
Dept: FAMILY MEDICINE CLINIC | Facility: CLINIC | Age: 63
End: 2018-04-16

## 2018-04-16 ENCOUNTER — LAB ENCOUNTER (OUTPATIENT)
Dept: LAB | Age: 63
End: 2018-04-16
Attending: FAMILY MEDICINE
Payer: MEDICAID

## 2018-04-16 VITALS
WEIGHT: 125 LBS | SYSTOLIC BLOOD PRESSURE: 152 MMHG | BODY MASS INDEX: 24 KG/M2 | HEART RATE: 56 BPM | DIASTOLIC BLOOD PRESSURE: 65 MMHG

## 2018-04-16 DIAGNOSIS — E11.9 TYPE 2 DIABETES MELLITUS WITHOUT COMPLICATION, WITHOUT LONG-TERM CURRENT USE OF INSULIN (HCC): ICD-10-CM

## 2018-04-16 DIAGNOSIS — R53.83 OTHER FATIGUE: ICD-10-CM

## 2018-04-16 DIAGNOSIS — K74.60 CIRRHOSIS OF LIVER NOT DUE TO ALCOHOL (HCC): ICD-10-CM

## 2018-04-16 DIAGNOSIS — E87.1 HYPONATREMIA: ICD-10-CM

## 2018-04-16 DIAGNOSIS — E11.9 TYPE 2 DIABETES MELLITUS WITHOUT COMPLICATION, WITHOUT LONG-TERM CURRENT USE OF INSULIN (HCC): Primary | ICD-10-CM

## 2018-04-16 PROCEDURE — 82607 VITAMIN B-12: CPT

## 2018-04-16 PROCEDURE — 99212 OFFICE O/P EST SF 10 MIN: CPT | Performed by: FAMILY MEDICINE

## 2018-04-16 PROCEDURE — 84443 ASSAY THYROID STIM HORMONE: CPT

## 2018-04-16 PROCEDURE — 83735 ASSAY OF MAGNESIUM: CPT

## 2018-04-16 PROCEDURE — 82140 ASSAY OF AMMONIA: CPT

## 2018-04-16 PROCEDURE — 82306 VITAMIN D 25 HYDROXY: CPT

## 2018-04-16 PROCEDURE — 36415 COLL VENOUS BLD VENIPUNCTURE: CPT

## 2018-04-16 PROCEDURE — 80048 BASIC METABOLIC PNL TOTAL CA: CPT

## 2018-04-16 PROCEDURE — 99214 OFFICE O/P EST MOD 30 MIN: CPT | Performed by: FAMILY MEDICINE

## 2018-04-16 RX ORDER — ESCITALOPRAM OXALATE 5 MG/1
5 TABLET ORAL DAILY
COMMUNITY
Start: 2018-04-11 | End: 2018-07-10

## 2018-04-16 NOTE — PROGRESS NOTES
Eleazar Caro is a 70year old female. Patient presents with:  Fatigue    HPI:   Was in [facility] for 1 month and since there more fatigue. Sleeping almost all day. Returned here a month ago. Went to see her liver specialist on Wednesday last week.  So (56.7 kg)   BMI 24.41 kg/m²   GENERAL: well developed, well nourished,in no apparent distress  SKIN: no rashes,no suspicious lesions  NECK: supple,no adenopathy,no bruits  LUNGS: clear to auscultation  CARDIO: RRR without murmur  GI: good BS's,no masses, H

## 2018-04-18 ENCOUNTER — TELEPHONE (OUTPATIENT)
Dept: FAMILY MEDICINE CLINIC | Facility: CLINIC | Age: 63
End: 2018-04-18

## 2018-04-18 NOTE — TELEPHONE ENCOUNTER
Daughter Gavino Benitez (MERRILL on file) informed of LB's result recommendations but states pt is already taking lactulose 3 times daily. [facility] only has phone number for liver specialist's nurse: [phone] Jose R Rosas RN for Dr Kun Dumont through [place].  I advised da

## 2018-04-18 NOTE — TELEPHONE ENCOUNTER
Her ammonia levels were elevated but sodium improving. Please have her increase the lactulose dose - it taking it once a day should increase to twice a day - if twice a day to 3 times a day. Known liver failure.  Can also let her liver specialist know - se

## 2018-04-18 NOTE — TELEPHONE ENCOUNTER
LB, pt's daughter is inquiring about vitamin/mineral results as states you had ordered those labs to see if that might explain why pt \"sleeps about 22 hours per day. \"    Please advise

## 2018-04-19 NOTE — TELEPHONE ENCOUNTER
Daughter informed of LB's response below and agrees. States is waiting on a call back from Dr Sung Rascon' office. Denies further questions/concerns at this time.

## 2018-04-19 NOTE — TELEPHONE ENCOUNTER
Yes. Everything was normal except for the ammonia levels. High ammonia levels will make her tired, drowsy and confused. Results were sent to [facility] to see if another medication can be added to bring down those levels.

## 2018-04-20 NOTE — PROGRESS NOTES
Other Tests were already reviewed. Also has low vitamin d levels. Patient to start daily supplements. I sent to the pharmacy.

## 2018-06-19 ENCOUNTER — OFFICE VISIT (OUTPATIENT)
Dept: FAMILY MEDICINE CLINIC | Facility: CLINIC | Age: 63
End: 2018-06-19

## 2018-06-19 VITALS
SYSTOLIC BLOOD PRESSURE: 150 MMHG | TEMPERATURE: 98 F | DIASTOLIC BLOOD PRESSURE: 75 MMHG | BODY MASS INDEX: 25 KG/M2 | WEIGHT: 126 LBS | HEART RATE: 55 BPM

## 2018-06-19 DIAGNOSIS — E11.9 TYPE 2 DIABETES MELLITUS WITHOUT COMPLICATION, WITHOUT LONG-TERM CURRENT USE OF INSULIN (HCC): ICD-10-CM

## 2018-06-19 DIAGNOSIS — R53.83 OTHER FATIGUE: ICD-10-CM

## 2018-06-19 DIAGNOSIS — K74.60 CIRRHOSIS OF LIVER NOT DUE TO ALCOHOL (HCC): Primary | ICD-10-CM

## 2018-06-19 PROCEDURE — 99215 OFFICE O/P EST HI 40 MIN: CPT | Performed by: FAMILY MEDICINE

## 2018-06-19 PROCEDURE — 99212 OFFICE O/P EST SF 10 MIN: CPT | Performed by: FAMILY MEDICINE

## 2018-06-19 NOTE — PROGRESS NOTES
Not taking medication as directed  Not taking lactulose as directed. Patient's past medical surgical family social history was reviewed. Review of Systems    Cardiovascular: no chest pain, irregular heartbeat, or palpitations.   Constitutional: no

## 2018-06-21 RX ORDER — LANCETS
EACH MISCELLANEOUS
Qty: 100 EACH | Refills: 2 | Status: SHIPPED | OUTPATIENT
Start: 2018-06-21 | End: 2018-06-25

## 2018-06-21 RX ORDER — FUROSEMIDE 40 MG/1
40 TABLET ORAL DAILY
Qty: 30 TABLET | Refills: 0 | Status: SHIPPED
Start: 2018-06-21 | End: 2018-08-02

## 2018-06-21 RX ORDER — LANCETS
EACH MISCELLANEOUS
Qty: 100 EACH | Refills: 2
Start: 2018-06-21

## 2018-06-22 NOTE — TELEPHONE ENCOUNTER
Hypertensive Medications  Protocol Criteria:  · Appointment scheduled in the past 6 months or in the next 3 months  · BMP or CMP in the past 12 months  · Creatinine result < 2  Recent Outpatient Visits            2 days ago Cirrhosis of liver not due to al

## 2018-06-22 NOTE — TELEPHONE ENCOUNTER
From: Jose Hodges  Sent: 6/21/2018 9:17 PM CDT  Subject: Medication Renewal Request    Jose Hodges would like a refill of the following medications:     furosemide 40 MG Oral Tab Donnamarie Spurling, APN, FNP-C]    Preferred pharmacy: [facility]

## 2018-06-22 NOTE — TELEPHONE ENCOUNTER
Refill Protocol Appointment Criteria  · Appointment scheduled in the past 12 months or in the next 3 months  Recent Outpatient Visits            2 days ago Cirrhosis of liver not due to alcohol [facility])    [facility], [place], [facility], [address]

## 2018-06-22 NOTE — TELEPHONE ENCOUNTER
From: Dirk Rahman  Sent: 6/21/2018 9:17 PM CDT  Subject: Medication Renewal Request    Dirk Rahman would like a refill of the following medications:     ANNEL CONTOUR NEXT TEST In Vitro Strip Bhargav Zhao MD]     MICROLET LANCETS Does no

## 2018-06-26 RX ORDER — LANCETS
EACH MISCELLANEOUS
Qty: 100 EACH | Refills: 2 | Status: SHIPPED
Start: 2018-06-26 | End: 2018-08-02

## 2018-06-26 NOTE — TELEPHONE ENCOUNTER
From: Naheed Gutierrez  Sent: 6/25/2018 8:10 PM CDT  Subject: Medication Renewal Request    Naheed Gutierrez would like a refill of the following medications:     Glucose Blood (ANNEL CONTOUR NEXT TEST) In Vitro Strip onda Kin Meraz MD]     Fatimah Swain

## 2018-06-27 NOTE — TELEPHONE ENCOUNTER
Refill Protocol Appointment Criteria  · Appointment scheduled in the past 12 months or in the next 3 months  Recent Outpatient Visits            1 week ago Cirrhosis of liver not due to alcohol [facility])    [address] [place], [place]

## 2018-06-28 ENCOUNTER — TELEPHONE (OUTPATIENT)
Dept: FAMILY MEDICINE CLINIC | Facility: CLINIC | Age: 63
End: 2018-06-28

## 2018-06-28 DIAGNOSIS — Z78.0 POST-MENOPAUSAL: Primary | ICD-10-CM

## 2018-06-28 NOTE — TELEPHONE ENCOUNTER
Hong Konger speaking - Pt's daughter anyi pt did have colonoscopy done in [facility] about 3 years ago also has never done a dexascan.  Please advise

## 2018-06-28 NOTE — TELEPHONE ENCOUNTER
Ask when the last colonoscopy and if has had dexascan - she is new to our practice so possible she is utd.  Order is there for the mammogram.

## 2018-06-28 NOTE — TELEPHONE ENCOUNTER
Pt sent a message through My Chart requesting appointments for health reminders she has received :      Fit Colorectal Screening   Dexa Scan     Please Advise.

## 2018-07-09 ENCOUNTER — LAB ENCOUNTER (OUTPATIENT)
Dept: LAB | Age: 63
End: 2018-07-09
Attending: FAMILY MEDICINE
Payer: MEDICAID

## 2018-07-09 DIAGNOSIS — K74.60 CIRRHOSIS OF LIVER NOT DUE TO ALCOHOL (HCC): ICD-10-CM

## 2018-07-09 DIAGNOSIS — E11.9 TYPE 2 DIABETES MELLITUS WITHOUT COMPLICATION, WITHOUT LONG-TERM CURRENT USE OF INSULIN (HCC): ICD-10-CM

## 2018-07-09 LAB
ALBUMIN SERPL BCP-MCNC: 2.7 G/DL (ref 3.5–4.8)
ALBUMIN/GLOB SERPL: 0.6 {RATIO} (ref 1–2)
ALP SERPL-CCNC: 119 U/L (ref 32–100)
ALT SERPL-CCNC: 39 U/L (ref 14–54)
AMMONIA PLAS-MCNC: 159 UG/DL (ref 19.5–64.6)
ANION GAP SERPL CALC-SCNC: 8 MMOL/L (ref 0–18)
AST SERPL-CCNC: 81 U/L (ref 15–41)
BASOPHILS # BLD: 0 K/UL (ref 0–0.2)
BASOPHILS NFR BLD: 1 %
BILIRUB SERPL-MCNC: 2 MG/DL (ref 0.3–1.2)
BUN SERPL-MCNC: 7 MG/DL (ref 8–20)
BUN/CREAT SERPL: 12.5 (ref 10–20)
CALCIUM SERPL-MCNC: 9 MG/DL (ref 8.5–10.5)
CHLORIDE SERPL-SCNC: 99 MMOL/L (ref 95–110)
CO2 SERPL-SCNC: 24 MMOL/L (ref 22–32)
CREAT SERPL-MCNC: 0.56 MG/DL (ref 0.5–1.5)
EOSINOPHIL # BLD: 0.1 K/UL (ref 0–0.7)
EOSINOPHIL NFR BLD: 2 %
ERYTHROCYTE [DISTWIDTH] IN BLOOD BY AUTOMATED COUNT: 18.5 % (ref 11–15)
GLOBULIN PLAS-MCNC: 4.7 G/DL (ref 2.5–3.7)
GLUCOSE SERPL-MCNC: 83 MG/DL (ref 70–99)
HBA1C MFR BLD: 5.4 % (ref 4–6)
HCT VFR BLD AUTO: 35.5 % (ref 35–48)
HGB BLD-MCNC: 12.5 G/DL (ref 12–16)
LYMPHOCYTES # BLD: 1.8 K/UL (ref 1–4)
LYMPHOCYTES NFR BLD: 41 %
MCH RBC QN AUTO: 33.6 PG (ref 27–32)
MCHC RBC AUTO-ENTMCNC: 35.1 G/DL (ref 32–37)
MCV RBC AUTO: 95.8 FL (ref 80–100)
MONOCYTES # BLD: 0.3 K/UL (ref 0–1)
MONOCYTES NFR BLD: 8 %
NEUTROPHILS # BLD AUTO: 2.2 K/UL (ref 1.8–7.7)
NEUTROPHILS NFR BLD: 50 %
OSMOLALITY UR CALC.SUM OF ELEC: 269 MOSM/KG (ref 275–295)
PATIENT FASTING: YES
PLATELET # BLD AUTO: 109 K/UL (ref 140–400)
PMV BLD AUTO: 8.5 FL (ref 7.4–10.3)
POTASSIUM SERPL-SCNC: 3.9 MMOL/L (ref 3.3–5.1)
PROT SERPL-MCNC: 7.4 G/DL (ref 5.9–8.4)
RBC # BLD AUTO: 3.71 M/UL (ref 3.7–5.4)
SODIUM SERPL-SCNC: 131 MMOL/L (ref 136–144)
WBC # BLD AUTO: 4.3 K/UL (ref 4–11)

## 2018-07-09 PROCEDURE — 36415 COLL VENOUS BLD VENIPUNCTURE: CPT

## 2018-07-09 PROCEDURE — 83036 HEMOGLOBIN GLYCOSYLATED A1C: CPT

## 2018-07-09 PROCEDURE — 80053 COMPREHEN METABOLIC PANEL: CPT

## 2018-07-09 PROCEDURE — 85025 COMPLETE CBC W/AUTO DIFF WBC: CPT

## 2018-07-09 PROCEDURE — 82140 ASSAY OF AMMONIA: CPT

## 2018-07-11 ENCOUNTER — HOSPITAL ENCOUNTER (OUTPATIENT)
Dept: BONE DENSITY | Age: 63
Discharge: HOME | End: 2018-07-11
Attending: FAMILY MEDICINE
Payer: MEDICAID

## 2018-07-11 ENCOUNTER — OFFICE VISIT (OUTPATIENT)
Dept: FAMILY MEDICINE CLINIC | Facility: CLINIC | Age: 63
End: 2018-07-11

## 2018-07-11 VITALS
TEMPERATURE: 98 F | HEART RATE: 57 BPM | BODY MASS INDEX: 24 KG/M2 | SYSTOLIC BLOOD PRESSURE: 153 MMHG | DIASTOLIC BLOOD PRESSURE: 88 MMHG | WEIGHT: 125 LBS

## 2018-07-11 DIAGNOSIS — R01.1 HEART MURMUR: ICD-10-CM

## 2018-07-11 DIAGNOSIS — G47.33 OSA (OBSTRUCTIVE SLEEP APNEA): ICD-10-CM

## 2018-07-11 DIAGNOSIS — M79.602 LEFT ARM PAIN: ICD-10-CM

## 2018-07-11 DIAGNOSIS — K74.60 CIRRHOSIS OF LIVER NOT DUE TO ALCOHOL (HCC): ICD-10-CM

## 2018-07-11 DIAGNOSIS — E11.9 TYPE 2 DIABETES MELLITUS WITHOUT COMPLICATION, WITHOUT LONG-TERM CURRENT USE OF INSULIN (HCC): ICD-10-CM

## 2018-07-11 DIAGNOSIS — M25.552 LEFT HIP PAIN: ICD-10-CM

## 2018-07-11 DIAGNOSIS — R53.83 FATIGUE, UNSPECIFIED TYPE: Primary | ICD-10-CM

## 2018-07-11 DIAGNOSIS — E87.1 HYPONATREMIA: ICD-10-CM

## 2018-07-11 DIAGNOSIS — Z78.0 POST-MENOPAUSAL: ICD-10-CM

## 2018-07-11 PROCEDURE — 77080 DXA BONE DENSITY AXIAL: CPT | Performed by: FAMILY MEDICINE

## 2018-07-11 PROCEDURE — 99215 OFFICE O/P EST HI 40 MIN: CPT | Performed by: FAMILY MEDICINE

## 2018-07-11 PROCEDURE — 99212 OFFICE O/P EST SF 10 MIN: CPT | Performed by: FAMILY MEDICINE

## 2018-07-11 NOTE — PROGRESS NOTES
epworth 23/24  Fatigue all the time  Always asleep. Here with her daughter and grandson  When I walked and patient was asleep on the table. Has some forgetfulness.   Daughter states that the liver specialist said that her fatigue is not due to her liver p edema  Left arm patient identifies tenderness in the muscle belly of the biceps restricted range of motion at the shoulders bilaterally no palpable masses.   Elbow and hand movements were normal.  Right lower back nontender on palpation over the pubic bone

## 2018-07-13 ENCOUNTER — HOSPITAL ENCOUNTER (OUTPATIENT)
Dept: MAMMOGRAPHY | Age: 63
Discharge: HOME | End: 2018-07-13
Attending: FAMILY MEDICINE
Payer: MEDICAID

## 2018-07-13 DIAGNOSIS — Z12.31 BREAST CANCER SCREENING BY MAMMOGRAM: ICD-10-CM

## 2018-07-13 PROCEDURE — 77067 SCR MAMMO BI INCL CAD: CPT | Performed by: FAMILY MEDICINE

## 2018-07-15 NOTE — PROGRESS NOTES
The dexascan showed osteoporosis - meaning decreased bone strength and at risk for a fracture if any fall occurs.  I would like you to start on weekly pill called Fosamax - need to follow instructions on taking it - on an empty stomach and stay upright for

## 2018-07-16 ENCOUNTER — TELEPHONE (OUTPATIENT)
Dept: FAMILY MEDICINE CLINIC | Facility: CLINIC | Age: 63
End: 2018-07-16

## 2018-07-16 NOTE — TELEPHONE ENCOUNTER
Pt's daughter is calling to ask if rx was ordered? She states her mother the pt had a test for osteopetrosis and  was going to send a rx. kel fernandez.  Thanks    Swedish speaker    She states the rx is called fosamax

## 2018-07-17 RX ORDER — ALENDRONATE SODIUM 70 MG/1
70 TABLET ORAL WEEKLY
Qty: 12 TABLET | Refills: 4 | Status: SHIPPED | OUTPATIENT
Start: 2018-07-17 | End: 2018-08-02

## 2018-07-17 NOTE — TELEPHONE ENCOUNTER
Spoke with patient's daughter (identified name and date of birth), results reviewed. Also advised that Fosamax had been ordered per below.       Viewed by Marli Loco on 7/17/2018 12:50 PM   Written by Debbi Harrington MD on 7/17/2018 12:35 PM   Mil

## 2018-07-17 NOTE — PROGRESS NOTES
Mild increase in liver enzymes since last checked. Sodium stable at 131.  Please refer to your hepatologist. - Dr. Armstrong Credit

## 2018-07-23 ENCOUNTER — OFFICE VISIT (OUTPATIENT)
Dept: SLEEP CENTER | Facility: HOSPITAL | Age: 63
End: 2018-07-23
Attending: FAMILY MEDICINE
Payer: MEDICAID

## 2018-07-23 DIAGNOSIS — G47.33 OSA (OBSTRUCTIVE SLEEP APNEA): ICD-10-CM

## 2018-07-23 DIAGNOSIS — R53.83 FATIGUE, UNSPECIFIED TYPE: ICD-10-CM

## 2018-07-23 PROCEDURE — 95806 SLEEP STUDY UNATT&RESP EFFT: CPT

## 2018-07-25 NOTE — PROCEDURES
[address]       Accredited by the [facility] ([facility])    PATIENT'S NAME:        Parviz Moore  ATTENDING PHYSICIAN:   Daniel Garcia M.D.   REFERRING PHYSICIAN:     PATIENT ACCOUNT #: on this HST, further clinical correlation is necessary to determine etiology of the patient's ongoing symptoms of sleepiness and fatigue.   2.   If there is a strong suspicion for obstructive sleep apnea, periodic limb movement disorder, or any other signif

## 2018-07-30 ENCOUNTER — OFFICE VISIT (OUTPATIENT)
Dept: NEUROLOGY | Facility: CLINIC | Age: 63
End: 2018-07-30
Payer: MEDICAID

## 2018-07-30 ENCOUNTER — PATIENT MESSAGE (OUTPATIENT)
Dept: NEUROLOGY | Facility: CLINIC | Age: 63
End: 2018-07-30

## 2018-07-30 ENCOUNTER — LAB ENCOUNTER (OUTPATIENT)
Dept: LAB | Facility: HOSPITAL | Age: 63
End: 2018-07-30
Attending: Other
Payer: MEDICAID

## 2018-07-30 ENCOUNTER — TELEPHONE (OUTPATIENT)
Dept: NEUROLOGY | Facility: CLINIC | Age: 63
End: 2018-07-30

## 2018-07-30 VITALS — HEIGHT: 60 IN | BODY MASS INDEX: 24.54 KG/M2 | WEIGHT: 125 LBS

## 2018-07-30 DIAGNOSIS — K74.60 CIRRHOSIS OF LIVER NOT DUE TO ALCOHOL (HCC): ICD-10-CM

## 2018-07-30 DIAGNOSIS — E87.1 HYPONATREMIA: ICD-10-CM

## 2018-07-30 DIAGNOSIS — G20 PARKINSON DISEASE (HCC): ICD-10-CM

## 2018-07-30 DIAGNOSIS — G47.61 PLMD (PERIODIC LIMB MOVEMENT DISORDER): ICD-10-CM

## 2018-07-30 DIAGNOSIS — G20 PARKINSON DISEASE (HCC): Primary | ICD-10-CM

## 2018-07-30 DIAGNOSIS — G25.81 RLS (RESTLESS LEGS SYNDROME): ICD-10-CM

## 2018-07-30 DIAGNOSIS — E11.9 TYPE 2 DIABETES MELLITUS WITHOUT COMPLICATION, WITHOUT LONG-TERM CURRENT USE OF INSULIN (HCC): ICD-10-CM

## 2018-07-30 LAB
ALBUMIN SERPL BCP-MCNC: 2.9 G/DL (ref 3.5–4.8)
ALBUMIN/GLOB SERPL: 0.6 {RATIO} (ref 1–2)
ALP SERPL-CCNC: 129 U/L (ref 32–100)
ALT SERPL-CCNC: 68 U/L (ref 14–54)
AMMONIA PLAS-MCNC: 112 UG/DL (ref 19.5–64.6)
ANION GAP SERPL CALC-SCNC: 4 MMOL/L (ref 0–18)
AST SERPL-CCNC: 94 U/L (ref 15–41)
BILIRUB SERPL-MCNC: 1.7 MG/DL (ref 0.3–1.2)
BUN SERPL-MCNC: 9 MG/DL (ref 8–20)
BUN/CREAT SERPL: 18 (ref 10–20)
CALCIUM SERPL-MCNC: 8.8 MG/DL (ref 8.5–10.5)
CHLORIDE SERPL-SCNC: 103 MMOL/L (ref 95–110)
CO2 SERPL-SCNC: 25 MMOL/L (ref 22–32)
CREAT SERPL-MCNC: 0.5 MG/DL (ref 0.5–1.5)
GLOBULIN PLAS-MCNC: 4.7 G/DL (ref 2.5–3.7)
GLUCOSE SERPL-MCNC: 86 MG/DL (ref 70–99)
HBA1C MFR BLD: 5.2 % (ref 4–6)
IRON SATN MFR SERPL: 22 % (ref 15–50)
IRON SERPL-MCNC: 65 MCG/DL (ref 28–170)
OSMOLALITY UR CALC.SUM OF ELEC: 272 MOSM/KG (ref 275–295)
PATIENT FASTING: YES
POTASSIUM SERPL-SCNC: 4.1 MMOL/L (ref 3.3–5.1)
PROT SERPL-MCNC: 7.6 G/DL (ref 5.9–8.4)
SODIUM SERPL-SCNC: 132 MMOL/L (ref 136–144)
TIBC SERPL-MCNC: 293 MCG/DL (ref 228–428)
TRANSFERRIN SERPL-MCNC: 222 MG/DL (ref 192–382)

## 2018-07-30 PROCEDURE — 82140 ASSAY OF AMMONIA: CPT

## 2018-07-30 PROCEDURE — 99204 OFFICE O/P NEW MOD 45 MIN: CPT | Performed by: OTHER

## 2018-07-30 PROCEDURE — 80053 COMPREHEN METABOLIC PANEL: CPT

## 2018-07-30 PROCEDURE — 83036 HEMOGLOBIN GLYCOSYLATED A1C: CPT

## 2018-07-30 PROCEDURE — 83540 ASSAY OF IRON: CPT

## 2018-07-30 PROCEDURE — 36415 COLL VENOUS BLD VENIPUNCTURE: CPT

## 2018-07-30 PROCEDURE — 84466 ASSAY OF TRANSFERRIN: CPT

## 2018-07-30 RX ORDER — ROPINIROLE 0.25 MG/1
TABLET, FILM COATED ORAL
Qty: 90 TABLET | Refills: 1 | Status: SHIPPED | OUTPATIENT
Start: 2018-07-30 | End: 2018-07-31

## 2018-07-30 RX ORDER — ESCITALOPRAM OXALATE 5 MG/1
5 TABLET ORAL DAILY
COMMUNITY
End: 2019-01-01

## 2018-07-30 RX ORDER — ERGOCALCIFEROL 1.25 MG/1
50000 CAPSULE ORAL
COMMUNITY
End: 2019-01-01

## 2018-07-30 NOTE — PROGRESS NOTES
Neurology Initial Visit     Referred By: Dr. Betancur ref. provider found    Chief Complaint: Patient presents with:  Neurologic Problem: Patient presents today with daughter complaining of fatigue going on for the last six months.  Patient denies any trouble s Oral Cap, Take 50,000 Units by mouth every 7 days. , Disp: , Rfl:   •  ROPINIRole HCl (REQUIP) 0.25 MG Oral Tab, Start with 1 pill QHS, for 1 week, then 2 pills qhs foor another week, then 3 pills qhs, Disp: 90 tablet, Rfl: 1  •  alendronate 70 MG Oral Tab, corneal reflexes intact  VII. Face symmetric, no facial weakness. Mild masked facies  VIII. Hearing intact to whisper, tuning fork or finger rub. IX. Pallet elevates symmetrically. XI. Shoulder shrug is intact  XII.  Tongue is midline    Motor Exam:  Musc of the problem with sleep might be related to the restless leg syndrome, will do a trial of dopamine agonist slowly tapering up the dose. In the meantime we will do iron profile to exclude iron deficiency as the cause of her restless leg syndrome    3.  PL

## 2018-07-30 NOTE — TELEPHONE ENCOUNTER
Mark for [facility] [employer] for authorization of approval for MRI brain wo.  Case # [id number], pending approval

## 2018-07-31 RX ORDER — ROPINIROLE 0.25 MG/1
TABLET, FILM COATED ORAL
Qty: 270 TABLET | Refills: 0 | Status: SHIPPED | OUTPATIENT
Start: 2018-07-31 | End: 2018-09-07

## 2018-07-31 NOTE — TELEPHONE ENCOUNTER
From: Meryl Mcarthur  To: Eleni Mcneill MD  Sent: 7/30/2018 9:47 PM CDT  Subject: Other    hello my name is Dafne consult the doctor and gave me a medication for Parkinson's but I travel to [facility] for vacation for three months and I need ref

## 2018-07-31 NOTE — TELEPHONE ENCOUNTER
Request for 90 day supply of requip 0.25 mg, titrate up to 3 tabs nightly, #270, no refills    Patient seen on 7/30/18

## 2018-08-01 ENCOUNTER — HOSPITAL ENCOUNTER (OUTPATIENT)
Dept: GENERAL RADIOLOGY | Age: 63
Discharge: HOME | End: 2018-08-01
Attending: FAMILY MEDICINE
Payer: MEDICAID

## 2018-08-01 ENCOUNTER — TELEPHONE (OUTPATIENT)
Dept: NEUROLOGY | Facility: CLINIC | Age: 63
End: 2018-08-01

## 2018-08-01 DIAGNOSIS — R01.1 HEART MURMUR: ICD-10-CM

## 2018-08-01 DIAGNOSIS — R53.83 FATIGUE, UNSPECIFIED TYPE: ICD-10-CM

## 2018-08-01 PROCEDURE — 71046 X-RAY EXAM CHEST 2 VIEWS: CPT | Performed by: FAMILY MEDICINE

## 2018-08-01 NOTE — TELEPHONE ENCOUNTER
Pt. is requesting a one time vacation override(will be in [facility] for 3 months)  for 90 day supply of requip 0.25 mg, titrate up to 3 tabs nightly, #270 to [facility].  Will inform Noy Durham

## 2018-08-02 RX ORDER — LANCETS
EACH MISCELLANEOUS
Qty: 100 EACH | Refills: 2 | Status: SHIPPED
Start: 2018-08-02 | End: 2018-01-01

## 2018-08-02 RX ORDER — ALENDRONATE SODIUM 70 MG/1
70 TABLET ORAL WEEKLY
Qty: 12 TABLET | Refills: 0 | Status: SHIPPED | OUTPATIENT
Start: 2018-08-02 | End: 2018-01-01

## 2018-08-02 RX ORDER — ROPINIROLE 0.25 MG/1
TABLET, FILM COATED ORAL
Qty: 270 TABLET | Refills: 0 | Status: CANCELLED
Start: 2018-08-02

## 2018-08-02 RX ORDER — FUROSEMIDE 40 MG/1
40 TABLET ORAL DAILY
Qty: 30 TABLET | Refills: 0 | Status: SHIPPED | OUTPATIENT
Start: 2018-08-02 | End: 2018-01-01

## 2018-08-02 NOTE — TELEPHONE ENCOUNTER
From: Josette Halsted  Sent: 8/2/2018 6:37 PM CDT  Subject: Medication Renewal Request    Debra Halsted would like a refill of the following medications:     Glucose Blood (ANNEL CONTOUR NEXT TEST) In Vitro Strip Barrett Burgos MD]     Viv Duran

## 2018-08-02 NOTE — TELEPHONE ENCOUNTER
From: Jose Hodges  Sent: 8/2/2018 6:37 PM CDT  Subject: Medication Renewal Request    Jose Hodges would like a refill of the following medications:     furosemide 40 MG Oral Tab SATNAM Muniz, FNP-C]    Preferred pharmacy: [facility]

## 2018-08-02 NOTE — TELEPHONE ENCOUNTER
Rec'd fax from [facility] with a Denial for patient's MRI Brain due to patient did not have movement problems include symptoms other than those that are common for Parkinson Disease, if you would like to do Peer / Peer, you can call [phone] with Case # 1

## 2018-08-03 ENCOUNTER — OFFICE VISIT (OUTPATIENT)
Dept: FAMILY MEDICINE CLINIC | Facility: CLINIC | Age: 63
End: 2018-08-03
Payer: MEDICAID

## 2018-08-03 VITALS
TEMPERATURE: 99 F | HEIGHT: 60 IN | HEART RATE: 59 BPM | DIASTOLIC BLOOD PRESSURE: 67 MMHG | BODY MASS INDEX: 23.95 KG/M2 | SYSTOLIC BLOOD PRESSURE: 155 MMHG | WEIGHT: 122 LBS

## 2018-08-03 DIAGNOSIS — E11.9 TYPE 2 DIABETES MELLITUS WITHOUT COMPLICATION, WITHOUT LONG-TERM CURRENT USE OF INSULIN (HCC): Primary | ICD-10-CM

## 2018-08-03 DIAGNOSIS — J43.9 PULMONARY EMPHYSEMA, UNSPECIFIED EMPHYSEMA TYPE (HCC): ICD-10-CM

## 2018-08-03 DIAGNOSIS — G25.81 RLS (RESTLESS LEGS SYNDROME): ICD-10-CM

## 2018-08-03 DIAGNOSIS — K74.60 CIRRHOSIS OF LIVER NOT DUE TO ALCOHOL (HCC): ICD-10-CM

## 2018-08-03 DIAGNOSIS — E55.9 VITAMIN D DEFICIENCY: ICD-10-CM

## 2018-08-03 DIAGNOSIS — E87.1 HYPONATREMIA: ICD-10-CM

## 2018-08-03 DIAGNOSIS — R53.83 FATIGUE, UNSPECIFIED TYPE: ICD-10-CM

## 2018-08-03 PROCEDURE — 99215 OFFICE O/P EST HI 40 MIN: CPT | Performed by: FAMILY MEDICINE

## 2018-08-03 PROCEDURE — 99212 OFFICE O/P EST SF 10 MIN: CPT | Performed by: FAMILY MEDICINE

## 2018-08-03 RX ORDER — LISINOPRIL 10 MG/1
10 TABLET ORAL DAILY
Qty: 90 TABLET | Refills: 3 | Status: SHIPPED | OUTPATIENT
Start: 2018-08-03 | End: 2018-01-01
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
## 2018-08-03 NOTE — TELEPHONE ENCOUNTER
From: Jean Helms  Sent: 8/2/2018 6:37 PM CDT  Subject: Medication Renewal Request    Jean Helms would like a refill of the following medications:     ROPINIRole HCl (REQUIP) 0.25 MG Oral Tab Herchel Seip, MD]    Preferred pharmacy:

## 2018-08-03 NOTE — TELEPHONE ENCOUNTER
Hypertensive Medications: Refilled per protocol    Protocol Criteria:  · Appointment scheduled in the past 6 months or in the next 3 months  · BMP or CMP in the past 12 months  · Creatinine result < 2  Recent Outpatient Visits            3 days ago [facility]

## 2018-08-03 NOTE — TELEPHONE ENCOUNTER
Refill Protocol Appointment Criteria  · Appointment scheduled in the past 12 months or in the next 3 months  Recent Outpatient Visits            3 days ago Parkinson disease [facility])    [address] [facility], [address] [place] [address] Drive, [address] [facility], [address] [place] [address]

## 2018-08-04 ENCOUNTER — APPOINTMENT (OUTPATIENT)
Dept: LAB | Age: 63
End: 2018-08-04
Attending: FAMILY MEDICINE
Payer: MEDICAID

## 2018-08-06 ENCOUNTER — TELEPHONE (OUTPATIENT)
Dept: FAMILY MEDICINE CLINIC | Facility: CLINIC | Age: 63
End: 2018-08-06

## 2018-08-06 NOTE — TELEPHONE ENCOUNTER
----- Message from Su Vann DO sent at 8/4/2018  7:06 AM CDT -----  In 3 weeks she should have the laboratory repeated in [facility].

## 2018-08-06 NOTE — TELEPHONE ENCOUNTER
LMTCB-ext 54510 today only    Notes recorded by Tamara Escobar DO on 8/4/2018 at 7:06 AM CDT  In 3 weeks she should have the laboratory repeated in [facility].  ------    Notes recorded by Jett Casillas RN on 8/3/2018 at 4:28 PM CDT  Patient had off

## 2018-08-29 ENCOUNTER — TELEPHONE (OUTPATIENT)
Dept: OTHER | Age: 63
End: 2018-08-29

## 2018-08-29 DIAGNOSIS — G20 PARKINSON DISEASE (HCC): Primary | ICD-10-CM

## 2018-08-29 NOTE — TELEPHONE ENCOUNTER
Pt's daughter, Sammy Al, is asking if doctor could order the MRI of the brain. Daughter states first MRI as ordered by Dr. Asuncion Nicole was denied and Dr. Fuad Mackay informed her he would place another order for the MRI. No order noted in EMR. Please advise.

## 2018-08-30 NOTE — TELEPHONE ENCOUNTER
Referral is needed for neurologist. [facility] please sign off.

## 2018-08-30 NOTE — TELEPHONE ENCOUNTER
Request was denied by insurance even when specialist recommended. They will have to go back to neuro to see if the test or different test can be ordered.

## 2018-09-07 RX ORDER — ROPINIROLE 0.25 MG/1
TABLET, FILM COATED ORAL
Qty: 270 TABLET | Refills: 0 | Status: SHIPPED | OUTPATIENT
Start: 2018-09-07 | End: 2018-11-02

## 2018-09-07 NOTE — TELEPHONE ENCOUNTER
Medication request: Ropinirole 0.25 mg, Take 3 tablets nightly.  Qt 270 Refills 0    LOV: 7/31/18  NOV: 11/2/18    Last refill: 7/31/18

## 2018-09-07 NOTE — TELEPHONE ENCOUNTER
From: Chelsey Torrez  Sent: 9/7/2018 6:47 AM CDT  Subject: Medication Renewal Request    Chelsey Torrez would like a refill of the following medications:     ROPINIRole HCl (REQUIP) 0.25 MG Oral Tab Jeanett Landau, MD]    Preferred pharmacy:

## 2018-09-08 ENCOUNTER — PATIENT MESSAGE (OUTPATIENT)
Dept: FAMILY MEDICINE CLINIC | Facility: CLINIC | Age: 63
End: 2018-09-08

## 2018-09-08 NOTE — TELEPHONE ENCOUNTER
From: Ricky Gillette  To:  King Bridges MD  Sent: 9/8/2018 12:22 PM CDT  Subject: Prescription Question    Please I need new refill for metoclopramide HCI 5 mg tanks you

## 2018-09-09 RX ORDER — METOCLOPRAMIDE 5 MG/1
5 TABLET ORAL
Qty: 90 TABLET | Refills: 5 | Status: SHIPPED | OUTPATIENT
Start: 2018-09-09 | End: 2018-01-01

## 2018-09-10 ENCOUNTER — TELEPHONE (OUTPATIENT)
Dept: FAMILY MEDICINE CLINIC | Facility: CLINIC | Age: 63
End: 2018-09-10

## 2018-09-10 NOTE — TELEPHONE ENCOUNTER
No Protocol on this med. Requested Prescriptions     Pending Prescriptions Disp Refills   • Metoclopramide HCl 5 MG Oral Tab  0     Sig: Take 1 tablet (5 mg total) by mouth.        Last Office Visit with PCP: 4/16/2018  Last Blood Pressures:  BP Reading

## 2018-09-10 NOTE — TELEPHONE ENCOUNTER
Georgian speaking - pt's daughter anyi she sent over lab results of blood work to [facility] [place] and wanted to know if it was received.  Please advise

## 2018-09-11 NOTE — TELEPHONE ENCOUNTER
Daughter informed of results being received by [facility] today. Patient will follow up when she returns.

## 2018-10-26 ENCOUNTER — TELEPHONE (OUTPATIENT)
Dept: FAMILY MEDICINE CLINIC | Facility: CLINIC | Age: 63
End: 2018-10-26

## 2018-10-26 NOTE — TELEPHONE ENCOUNTER
Per daughter of pt,  Pharmacy send already rx med Lactulose but nothing was received,  Daughter stts that pt is out of med. Pls advise.       Current Outpatient Medications:                                                        lactulose 10 GM/15ML Oral S

## 2018-10-26 NOTE — TELEPHONE ENCOUNTER
Lactulose only in chart as pt reported and does not have instructions.  Called daughter who states already called [facility] specialist who prescribes it and was sent an Rx; states to disregard request.

## 2018-10-30 ENCOUNTER — LAB ENCOUNTER (OUTPATIENT)
Dept: LAB | Age: 63
End: 2018-10-30
Attending: FAMILY MEDICINE
Payer: MEDICAID

## 2018-10-30 DIAGNOSIS — E55.9 VITAMIN D DEFICIENCY: ICD-10-CM

## 2018-10-30 DIAGNOSIS — K74.60 CIRRHOSIS OF LIVER NOT DUE TO ALCOHOL (HCC): ICD-10-CM

## 2018-10-30 DIAGNOSIS — R53.83 FATIGUE, UNSPECIFIED TYPE: ICD-10-CM

## 2018-10-30 PROCEDURE — 80053 COMPREHEN METABOLIC PANEL: CPT

## 2018-10-30 PROCEDURE — 82306 VITAMIN D 25 HYDROXY: CPT

## 2018-10-30 PROCEDURE — 85025 COMPLETE CBC W/AUTO DIFF WBC: CPT

## 2018-10-30 PROCEDURE — 82140 ASSAY OF AMMONIA: CPT

## 2018-10-30 PROCEDURE — 36415 COLL VENOUS BLD VENIPUNCTURE: CPT

## 2018-11-02 ENCOUNTER — TELEPHONE (OUTPATIENT)
Dept: NEUROLOGY | Facility: CLINIC | Age: 63
End: 2018-11-02

## 2018-11-02 ENCOUNTER — HOSPITAL ENCOUNTER (OUTPATIENT)
Dept: GENERAL RADIOLOGY | Age: 63
Discharge: HOME | End: 2018-11-02
Attending: FAMILY MEDICINE
Payer: MEDICAID

## 2018-11-02 ENCOUNTER — TELEPHONE (OUTPATIENT)
Dept: FAMILY MEDICINE CLINIC | Facility: CLINIC | Age: 63
End: 2018-11-02

## 2018-11-02 ENCOUNTER — OFFICE VISIT (OUTPATIENT)
Dept: FAMILY MEDICINE CLINIC | Facility: CLINIC | Age: 63
End: 2018-11-02
Payer: MEDICAID

## 2018-11-02 ENCOUNTER — LAB ENCOUNTER (OUTPATIENT)
Dept: LAB | Age: 63
End: 2018-11-02
Attending: FAMILY MEDICINE
Payer: MEDICAID

## 2018-11-02 ENCOUNTER — OFFICE VISIT (OUTPATIENT)
Dept: NEUROLOGY | Facility: CLINIC | Age: 63
End: 2018-11-02
Payer: MEDICAID

## 2018-11-02 VITALS
HEIGHT: 60 IN | BODY MASS INDEX: 25.32 KG/M2 | DIASTOLIC BLOOD PRESSURE: 58 MMHG | SYSTOLIC BLOOD PRESSURE: 122 MMHG | HEART RATE: 68 BPM | WEIGHT: 129 LBS

## 2018-11-02 VITALS
BODY MASS INDEX: 25 KG/M2 | TEMPERATURE: 99 F | HEART RATE: 62 BPM | SYSTOLIC BLOOD PRESSURE: 122 MMHG | DIASTOLIC BLOOD PRESSURE: 58 MMHG | WEIGHT: 127 LBS

## 2018-11-02 DIAGNOSIS — R05.3 CHRONIC COUGH: ICD-10-CM

## 2018-11-02 DIAGNOSIS — J43.9 PULMONARY EMPHYSEMA, UNSPECIFIED EMPHYSEMA TYPE (HCC): ICD-10-CM

## 2018-11-02 DIAGNOSIS — K74.60 CIRRHOSIS OF LIVER NOT DUE TO ALCOHOL (HCC): ICD-10-CM

## 2018-11-02 DIAGNOSIS — R27.0 ATAXIA: ICD-10-CM

## 2018-11-02 DIAGNOSIS — G25.9 MOVEMENT DISORDER: ICD-10-CM

## 2018-11-02 DIAGNOSIS — G92.8: ICD-10-CM

## 2018-11-02 DIAGNOSIS — R29.810 FACIAL WEAKNESS: Primary | ICD-10-CM

## 2018-11-02 DIAGNOSIS — R13.10 DYSPHAGIA, UNSPECIFIED TYPE: ICD-10-CM

## 2018-11-02 DIAGNOSIS — G20 PARKINSON DISEASE (HCC): ICD-10-CM

## 2018-11-02 DIAGNOSIS — R05.3 CHRONIC COUGH: Primary | ICD-10-CM

## 2018-11-02 DIAGNOSIS — E11.9 TYPE 2 DIABETES MELLITUS WITHOUT COMPLICATION, WITHOUT LONG-TERM CURRENT USE OF INSULIN (HCC): ICD-10-CM

## 2018-11-02 PROCEDURE — 90471 IMMUNIZATION ADMIN: CPT | Performed by: FAMILY MEDICINE

## 2018-11-02 PROCEDURE — 99212 OFFICE O/P EST SF 10 MIN: CPT | Performed by: FAMILY MEDICINE

## 2018-11-02 PROCEDURE — 82140 ASSAY OF AMMONIA: CPT

## 2018-11-02 PROCEDURE — 99215 OFFICE O/P EST HI 40 MIN: CPT | Performed by: FAMILY MEDICINE

## 2018-11-02 PROCEDURE — 99215 OFFICE O/P EST HI 40 MIN: CPT | Performed by: OTHER

## 2018-11-02 PROCEDURE — 86480 TB TEST CELL IMMUN MEASURE: CPT

## 2018-11-02 PROCEDURE — 71046 X-RAY EXAM CHEST 2 VIEWS: CPT | Performed by: FAMILY MEDICINE

## 2018-11-02 PROCEDURE — 90653 IIV ADJUVANT VACCINE IM: CPT | Performed by: FAMILY MEDICINE

## 2018-11-02 PROCEDURE — 36415 COLL VENOUS BLD VENIPUNCTURE: CPT

## 2018-11-02 RX ORDER — ROPINIROLE 1 MG/1
TABLET, FILM COATED ORAL
Qty: 60 TABLET | Refills: 5 | Status: SHIPPED | OUTPATIENT
Start: 2018-11-02 | End: 2018-01-01

## 2018-11-02 NOTE — PROGRESS NOTES
Neurology follow-up visit     Referred By: Dr. Betancur ref. provider found    Chief Complaint: Patient presents with:  Parkinson's: LOV 7-30-18 pt is here to f/u on parkinsons.  Pt states tremors have being the same since last office visit, currently taking RO worse as well, more so in the legs    Past Medical History:   Diagnosis Date   • Diabetes [facility])        Past Surgical History:   Procedure Laterality Date   • CHOLECYSTECTOMY         Social history:    Smoking status: Never Smoker   Smokeless tobacco: Never preserved, normal glutition  Neck- No masses or adenopathy  Cv: pulses were palpable and normal, no cyanosis or edema     Neurological:     Mental Status- Alert and oriented x3.   Normal attention span and concentration  Thought process slow  Memory intact- HCT 38.1 10/30/2018    MCV 98.0 10/30/2018    WBC 4.8 10/30/2018     (L) 10/30/2018      Lab Results   Component Value Date    BUN 11 10/30/2018    CA 9.3 10/30/2018    ALT 42 10/30/2018    AST 70 (H) 10/30/2018    ALB 2.9 (L) 10/30/2018     1

## 2018-11-02 NOTE — PROGRESS NOTES
Has appt for Mri 11/5  Then an appt with Dr. Cynthia San in [facility] 7mo+ a year. And Arie  (daughter here) reports they don't take good care of her. They don't make her take her medication. (lactulose)    No tobacco  Has been cough 2 + months.   Wo Breath Activated; Inhale 1 puff into the lungs daily. Dispense: 3 each; Refill: 3  - XR CHEST PA + LAT CHEST (CPT=71046); Future    2.  Pulmonary emphysema, unspecified emphysema type (HCC)  pulmo  - FLU VACCINE ADJUVANT IM  - QUANTIFERON TB; Future  - PUL

## 2018-11-02 NOTE — TELEPHONE ENCOUNTER
Pts daughter called stating insurance does not cover     Current Outpatient Medications:  Fluticasone-Umeclidin-Vilant (TRELEGY ELLIPTA) 100-62.5-25 MCG/INH Inhalation Aerosol Powder, Breath Activated Inhale 1 puff into the lungs daily.  Disp: 3 each Rfl: 3

## 2018-11-02 NOTE — TELEPHONE ENCOUNTER
[employer] for authorization of approval for MRI brain wo. Approval was given with  Authorization # [id number] effective 11/02/18 to 12/17/18. Will call Pt. To inform. L/m on Hilary's v/m advising of approval. Can proceed with scheduling appts.

## 2018-11-03 RX ORDER — FLUTICASONE PROPIONATE AND SALMETEROL 113; 14 UG/1; UG/1
1 POWDER, METERED RESPIRATORY (INHALATION) 2 TIMES DAILY
Qty: 1 EACH | Refills: 3 | Status: SHIPPED | OUTPATIENT
Start: 2018-11-03 | End: 2018-01-01

## 2018-11-03 NOTE — TELEPHONE ENCOUNTER
Pharmacy is close right now. Fluticasone-Umeclidin-Vilant (TRELEGY ELLIPTA) 100-62.5-25 MCG/INH Inhalation Aerosol Powder, Breath Activated 3 each 3 11/2/2018    Sig :  Inhale 1 puff into the lungs daily.      Route:   Inhalation     Order #: [id number]

## 2018-11-03 NOTE — TELEPHONE ENCOUNTER
for  Pls see daughter message below. I called Pharmacy and was inform this medication is not covered. Pharmacist suggested you can try ordering Airduo and Incruse.    If you will like this please send pharmacy two new scripts or change medica

## 2018-11-03 NOTE — TELEPHONE ENCOUNTER
please send the AirDuo and Incruse scripts. There are 3 different doses for Airduo and see Incruse Ellipta pended script. I do not feel comfortable sending these scripts.  THanks

## 2018-11-05 ENCOUNTER — HOSPITAL ENCOUNTER (OUTPATIENT)
Dept: MRI IMAGING | Facility: HOSPITAL | Age: 63
Discharge: HOME | End: 2018-11-05
Attending: Other
Payer: MEDICAID

## 2018-11-05 ENCOUNTER — TELEPHONE (OUTPATIENT)
Dept: FAMILY MEDICINE CLINIC | Facility: CLINIC | Age: 63
End: 2018-11-05

## 2018-11-05 DIAGNOSIS — G20 PARKINSON DISEASE (HCC): ICD-10-CM

## 2018-11-05 PROCEDURE — 70551 MRI BRAIN STEM W/O DYE: CPT | Performed by: OTHER

## 2018-11-05 NOTE — TELEPHONE ENCOUNTER
Lab  Called with positive results. QUANTIFERON TB   Order: [id number]   Collected:  11/2/2018  3:37 PM   Status:  Final result   Dx:  Chronic cough; Pulmonary emphysema, u...     Ref Range & Units 11/2/18  3:37 PM   Quantiferon TB Gold NIL IU/mL 0.090

## 2018-11-05 NOTE — TELEPHONE ENCOUNTER
Per Pharm plan does not cover this medication, Please call  Plan at [phone]   for Yan Bradshaw oral INH (30 puffs)  Qty 180  Please advs

## 2018-11-05 NOTE — TELEPHONE ENCOUNTER
Tb test positive. Please mail quant gold results along with cxr report to patient. Take them to the [facility] tb clinic. Family may need to be tested as well, ask the tb clinc  .  F/u with Dr. Yoav Schaeffer as well.     Very important she receive help from tb

## 2018-11-06 ENCOUNTER — TELEPHONE (OUTPATIENT)
Dept: NEUROLOGY | Facility: CLINIC | Age: 63
End: 2018-11-06

## 2018-11-06 ENCOUNTER — TELEPHONE (OUTPATIENT)
Dept: FAMILY MEDICINE CLINIC | Facility: CLINIC | Age: 63
End: 2018-11-06

## 2018-11-06 DIAGNOSIS — K72.10 CHRONIC LIVER FAILURE WITHOUT HEPATIC COMA (HCC): Primary | ICD-10-CM

## 2018-11-06 NOTE — TELEPHONE ENCOUNTER
Daughter rCistal Dress contacted using  # [phone]. She stated her mom was treated about 1 year ago for TB does she really need to go. Advised to contact TB clinic ASAP and gave additional information by doctor.  She verbalized understanding she would

## 2018-11-06 NOTE — TELEPHONE ENCOUNTER
Ukrainian speaking - Pt's daughter would like a copy of last TB taken. Pt's daughter is on HIPPA her name is Michel Lora and would like to  at office in [place] today.  Please advise

## 2018-11-06 NOTE — PROGRESS NOTES
Called with the assistance of language line solutions (#). [phone]   Relayed Dr Rishabh Talley message re: ammonia, they plan to continue lactulose and repeat the fasting labs near the end of December, patient is going to [facility] in January

## 2018-11-07 ENCOUNTER — TELEPHONE (OUTPATIENT)
Dept: FAMILY MEDICINE CLINIC | Facility: CLINIC | Age: 63
End: 2018-11-07

## 2018-11-07 ENCOUNTER — OFFICE VISIT (OUTPATIENT)
Dept: SPEECH THERAPY | Facility: HOSPITAL | Age: 63
End: 2018-11-07
Attending: Other
Payer: MEDICAID

## 2018-11-07 DIAGNOSIS — R13.10 DYSPHAGIA, UNSPECIFIED TYPE: ICD-10-CM

## 2018-11-07 PROCEDURE — 92610 EVALUATE SWALLOWING FUNCTION: CPT

## 2018-11-07 PROCEDURE — 92523 SPEECH SOUND LANG COMPREHEN: CPT

## 2018-11-07 NOTE — TELEPHONE ENCOUNTER
Tessie outpt Speech Therapist asking if pt should keep appt this afternoon as scheduled for a swallowing evaluation. States pt tested positive for TB and she noted on 11/5/18 results note pt was informed the cough could be contagious.  Tarsha Tello concerned as

## 2018-11-07 NOTE — PATIENT INSTRUCTIONS
SWALLOWING INSTRUCTIONS    DIET: Regular solids w/ extra sauces and gravies and thin liquids       ____ SIT UPRIGHT    ____ SMALL BITES AND SIPS    ____ EAT SLOWLY    ____ ALTERNATED LIQUIDS AND SOLIDS    ____ Isrrael Bullock TWICE WITH EACH BITE    ____ NO STRA

## 2018-11-08 NOTE — PROGRESS NOTES
ADULT SWALLOW/SPEECH/LANGUAGE/COGNITIVE  EVALUATION:   Referring Physician: Dr. Los Hua  Diagnosis: Dysphagia   Cognitive-Linguistic Disorder  Date of Service: 11/7/2018     It should be noted a Taiwanese Speaking Javed Lemus, [place] # [id number], was used right cerebellum. 6. Mild right maxillary sinus mucosal thickening. 7. Lesser incidental findings as above. CXR 11/2/18   =====  LUNGS/PLEURA:The lungs do appear hyperinflated. There is minimal scarring or atelectasis noted.  Otherwise no significant p naming. Clock drawing appears impaired with no functional  placement of numbers and/or hands. Micrographia observed. In regards to speech production, pt with a low conversational  volume; however, no dysarthria observed throughout the entire session.  At t liquids with no overt CSA for 100% of meal provided supervision with 100% accuracy across 3 sessions. 2. Family/Caregiver will demonstrate understanding of aspiration precautions and swallow compensatory strategies with 100% accuracy across 3 sessions. need for these services furnished under this plan of treatment and while under my care.       X______________________________________________ Date________________  Certification From: 46/9/6820      To: 2/5/2019

## 2018-12-05 NOTE — TELEPHONE ENCOUNTER
Contacted Kirsten regarding request for refill of Ropinirole 1mg tabs and patient having refills. [facility] did not answer phone therefore detailed message was left informing them of refills that are on file and to call back if discrepancy.

## 2018-12-05 NOTE — TELEPHONE ENCOUNTER
Medication request: Ropinirole 1mg #60 r-5 was e-scribed to [facility] in [place] on 11/2/18  Patient has refill  Request denied

## 2018-12-07 NOTE — PROGRESS NOTES
[address], [place]    Report of Consultation    Leanna Candy Patient Status:  Outpatient    1955 MRN [id number]   Location [address], [place] Attending No att. providers found   Hosp Day # 0 PCP Micheal Austin deficit     Results:     Laboratory Data:  Lab Results   Component Value Date    WBC 4.8 10/30/2018    HGB 13.2 10/30/2018    HCT 38.1 10/30/2018     (L) 10/30/2018    CREATSERUM 0.69 10/30/2018    BUN 11 10/30/2018     10/30/2018    K 4.3 10/

## 2018-12-07 NOTE — TELEPHONE ENCOUNTER
Refill passed per [facility] protocol.   Refill Protocol Appointment Criteria  · Appointment scheduled in the past 12 months or in the next 3 months  Recent Outpatient Visits            4 weeks ago Dysphagia, unspecified type     [facility] Claudell Hilt, [place] [address]    In 3 weeks Claudell Hilt, [place] [address]    In 1 month Claudell Hilt, [place] [address]

## 2018-12-10 NOTE — PATIENT INSTRUCTIONS
1. Continue with dysphagia exercises  2. Please obtain a memory book/planner for safety and orientation         TONGUE BASE EXERCISES    Complete in sets of 5-10 at least 2-3 times a day. Use a mirror to help with placement.     1. Stick your tongue straig for 3 seconds. 2. Say \"eee\" and glide up the scale from low to high pitch. 3. Say \"eee\" and glide down the scale from high pitch to low. 4. Say \"ah\" as low as you can and then \"rose\" as high as you can.         ADDUCTION EXERCISES      • Pus

## 2018-12-10 NOTE — PROGRESS NOTES
NEUROLOGICAL PHYSICAL THERAPY EVALUATION:   Referring Physician: Dr. Cornell Pain  Diagnosis: Parkinson's Disease      Date of Onset: June 2018 Date of Service: 12/10/2018     PATIENT SUMMARY   Bary Hodgkin is a 70year old y/o female who presents to t functional outcome measures and clinical rationale, this evaluation involved Moderate Complexity decision making due to 1-2 personal factors/comorbidities, 4+ body structures involved/activity limitations, and evolving symptoms including declining function 45 min         PLAN OF CARE:    Goals to be met within POC or 90 days:  1. Pt's daughter to be independent in providing cues for proper performance of HEP. 2. Pt will be able to perform HEP with VC's only.    3. Pt will be able to ambulate performing visu

## 2018-12-10 NOTE — TELEPHONE ENCOUNTER
Pt's daughter states the pulmonary Dr advised her to be off this medication due to it causing her a cough. Please advise. Thank you  Requesting an alternative.      Current Outpatient Medications:  lisinopril 10 MG Oral Tab Take 1 tablet (10 mg total) b

## 2018-12-10 NOTE — PROGRESS NOTES
Diagnosis: Dysphagia;Cognitive-Linguistic   Authorized # of Visits:  12       Next MD visit: none scheduled  Fall Risk: standard         Precautions: n/a           Medication Changes since last visit?: No  Subjective: Pt reports nothing new.   se Cog Tx   D/t time restrictions, the remaining portions of the CLQT were not administered in today's evaluation. Pt requires mild support to correctly respond to biographical and temporal information.  Pt able to list 8-10 items across 2 simple categorie Charges: [id number]  Total Timed Treatment: 45 min  Total Treatment Time: 45 min

## 2018-12-11 NOTE — PROGRESS NOTES
Neurology follow-up visit     Referred By: Dr. Betancur ref. provider found    Chief Complaint: Patient presents with:  Parkinson's: LOV 11-2-18 pt is here to f/u on tremors. per daughter legs tremors are still the same since last office visit no improvemt.  ta Tremors were getting worse as well, more so in the legs. Patient came back for the follow-up and December 2018, her sleep is still quite better but still had some tremors during the daytime especially on the right side.   Failed to reveal any    Past Me DIRECTED, Disp: 100 strip, Rfl: 2  •  MICROLET LANCETS Does not apply Misc, USE THREE TIMES DAILY AS DIRECTED, Disp: 100 each, Rfl: 2  •  escitalopram 5 MG Oral Tab, Take 5 mg by mouth daily. , Disp: , Rfl:   •  ergocalciferol 01353 units Oral Cap, Take 50, or fasciculations  Strength- upper extremities 5/5 proximally and distally                  - lower  extremities 5/5 proximally and distally    Sensory Exam:  Light touch sensation- intact in all 4 extremeties    Deep Tendon Reflexes:  Biceps 2+ bilateral verbalized understanding of information given. All questions were answered. All side effects of drugs were discussed. Return to clinic in: No Follow-up on file.     Ludwig Cordero MD

## 2018-12-12 NOTE — PROGRESS NOTES
SPEECH/SWALLOW THERAPY DISCHARGE SUMMARY     Diagnosis: Dysphagia;Cognitive-Linguistic   Authorized # of Visits:  12       Next MD visit: none scheduled  Fall Risk: standard         Precautions: n/a           Medication Changes since last visit?: No  [place] immediate/delayed cough, wet vocal quality, increased O2 effort) observed across all trials. D/t the degenerative nature of PD, please continue exercises 2-3x daily.  At this time, d/c skilled swallowing services per pt's request. Pt is traveling to [facility] following a short paragraph with 90% accuracy provided multi-modal cues. Not met ad/t self-discharge    4. Pt will name at least 6 items provided a simple category with 90% accuracy provided multi-modal cues. MET   5.  Pt will complete simple problem solvin

## 2018-12-12 NOTE — PATIENT INSTRUCTIONS
Hyolaryngeal Elevation Exercises      1. Push your hands together and say a high pitch \"eee\". Hold for 3 seconds. 2. Say \"eee\" and glide up the scale from low to high pitch. 3. Say \"eee\" and glide down the scale from high pitch to low. very hard with your            tongue and throat muscles throughout the swallow. Excess effort should be clearly visible in your neck            during the swallow. * Perform with each food/ liquid swallow.       * Perform 20-50 times gentryu [place]         Count        Goat Gold      Garden    Garlic      Correct    Caution      Cage                  Guilt   Gas       Gather     [facility]       Curtain

## 2018-12-12 NOTE — PROGRESS NOTES
Diagnosis: Parkinson's Disease   Visit (# authorized):  6 visits per POC (7 visits approved including eval exp 1/9/2019- [facility])                        Referring Physician: Dr. Evelin Mayer    Precautions:  At risk for falls     Medication changes since last visi

## 2018-12-13 NOTE — TELEPHONE ENCOUNTER
Notified daughter of Dr. Veronica Waterman orders. Explained inhalers (AirDuo & Incruse Ellipta) appear to be newer meds and pt should take inhalers as ordered/directed on label if she is not doing well.  Reassured her that it does take some time for medicine to wor

## 2018-12-14 NOTE — TELEPHONE ENCOUNTER
----- Message from Cristin Castro DO sent at 12/10/2018  8:03 PM CST -----  Needs to follow up with the gi specialist.  Refer Dr. Sami Shelton. Dx cirrhosis of the liver.

## 2018-12-14 NOTE — TELEPHONE ENCOUNTER
call # [phone]    Spoke with daughter Raji Brown (MERRILL verified) and relayed Aia 16 message below and GI # to schedule appt--daughter states, \"but she already sees a liver specialist at [place] [address].  She has an appointment next week with him,

## 2018-12-17 NOTE — PROGRESS NOTES
Diagnosis: Parkinson's Disease   Visit (# authorized):  6 visits per POC (7 visits approved including eval exp 1/9/2019- [facility])                        Referring Physician: Dr. Ayah Winter    Precautions:  At risk for falls     Medication changes since last visi

## 2018-12-18 NOTE — TELEPHONE ENCOUNTER
Dr. Sabrina Samayoa from [address] [place] [address] TB unit calling requesting to speak to physician covering Christal Hess MD patients today. RN attempted to inquire reason for call.    Dr. Sabrina Samayoa, states she prefers to speak to covering physician (Dr. Jas Alfaro

## 2018-12-18 NOTE — TELEPHONE ENCOUNTER
[facility] Dept of [address] [facility]  [phone]  Closes at 4:30 pm

## 2018-12-18 NOTE — TELEPHONE ENCOUNTER
Called and discussed with Dr. Antoinette Davalos patient received rifampin for march 2017 until July 2018.

## 2018-12-21 NOTE — TELEPHONE ENCOUNTER
•  Fluticasone-Umeclidin-Vilant (TRELEGY ELLIPTA) 100-62.5-25 MCG/INH Inhalation Aerosol Powder, Breath Activated, Inhale 1 puff into the lungs daily. , Disp: 3 each, Rfl: 0

## 2018-12-21 NOTE — PROGRESS NOTES
Patient comes in with her daughter with a number of issues. #1 we need to discuss her cirrhosis. Her ammonia level has come down slightly. Over the last 3 months is gone from 200s to the 130s.   Patient does take her liquid lactulose a number of times a there was no supraclavicular adenopathy palpated  Extremities there was no cyanosis or edema    Assessment/ Plan    1. Chronic cough  Verapamil    2. Pulmonary emphysema, unspecified emphysema type ([address]  We will have her follow-up with Dr. Ashely Curry.   We terrie

## 2018-12-21 NOTE — TELEPHONE ENCOUNTER
PA for Trelegy Ellipta 100-62.5-25 mcg/inh inhaler completed with [employer] via CMM response time 3-5 business days KEY [id number].

## 2018-12-22 NOTE — TELEPHONE ENCOUNTER
Pt's daughter is calling states pt needs her inhalers [place]. PA was called yesterday no answer for 3-5 business days. Please Advise.

## 2018-12-22 NOTE — TELEPHONE ENCOUNTER
Patient's daughter advised insurance is not covering inhalers that were sent, advised of PA process, agreed to wait 3-5 days to verify if insurance will cover.

## 2018-12-24 PROBLEM — A08.4 VIRAL GASTROENTERITIS: Status: ACTIVE | Noted: 2018-01-01

## 2018-12-24 PROBLEM — J06.9 VIRAL UPPER RESPIRATORY TRACT INFECTION: Status: ACTIVE | Noted: 2018-01-01

## 2018-12-24 PROBLEM — S16.1XXA NECK STRAIN: Status: ACTIVE | Noted: 2018-01-01

## 2018-12-24 NOTE — PROGRESS NOTES
Patient Name: Rivera Rowan, : 1955, MRN: [id number]   Date:  2018  Referring Physician:  George Garnica    Diagnosis: Parkinson's Disease    Discharge Summary    Pt has attended 3 visits in Physical Therapy.      [place]

## 2018-12-24 NOTE — PROGRESS NOTES
HPI  Pt here for vomiting and post neck pain. S/s started last night. Denies fever. Has slight cough. Has h/o nausea but is out of refills on zofran. Denies diarrhea or fever.      Review of Systems   Constitutional: Positive for activity change, appetite Food insecurity - worry: Not on file      Food insecurity - inability: Not on file      Transportation needs - medical: Not on file      Transportation needs - non-medical: Not on file    Occupational History      Not on file    Tobacco Use      Smokin Metoclopramide HCl 5 MG Oral Tab Take 1 tablet (5 mg total) by mouth 3 (three) times daily before meals.  Disp: 90 tablet Rfl: 5   Glucose Blood (ANNEL CONTOUR NEXT TEST) In Vitro Strip TEST THREE TIMES DAILY AS DIRECTED Disp: 100 strip Rfl: 2   escitalopra She has no cervical adenopathy. Neurological: She is alert and oriented to person, place, and time. Coordination normal.   Skin: Skin is warm and dry. No rash noted. Psychiatric: She has a normal mood and affect.  Her behavior is normal. Judgment and

## 2018-12-24 NOTE — PATIENT INSTRUCTIONS
Gastroenteritis Viral [Viral Gastroenteritis, 6Yr-Adult]    Gastroenteritis es otro nombre que se le da a la “gripe estomacal”.  A menudo, la causa un virus que afecta al [employer] y el tracto intestinal. Los síntomas incluyen retortijones estomacales y fi · POSTRES: Gelatina neetu (Jell-O), helados de jugo y barras de jugo de frutas. [place], a lo anterior, puede agregarle lo siguiente:  · Cereal caliente, tostadas solas, pan, bolillos (pancitos), galletas.   · [employer], arrjose luis, puré · Dolor abdominal en aumento  · Vómito persistente (no puede retener los líquidos)  · Diarrea frecuente (más de 5 veces al día)  · Mehul en el vómito o en las heces (de color rojizo o negruzco)  · Orina de color oscuro, lore cantidad de [place] o exceso de 2) Al acostarse, emplee shy almohada cómoda para apoyar la veronique y mantener la columna en shy posición neutra. La veronique no debería quedar inclinada hacia adelante ni hacia atrás.   3) Use compresas de hielo (hielo en shy bolsa plástica, envuelta en shy to © 4919-3352 [address]. [facility], [facility]. Todos los derechos reservados. Esta información no pretende sustituir la atención médica profesional. Sólo edouard médico puede diagnosticar y tratar un problema de bev. 5) Los medicamentos sin receta para el resfriado no acortarán la duración de la enfermedad, janette pueden ser útiles para aliviar los siguientes síntomas: tos (Robitussin MD); dolor de garganta (Chloraseptic en comprimidos o spray); congestión nasal y de los

## 2018-12-26 NOTE — PROCEDURES
Pulmonary Function Test     [facility] [address] Patient Status:  Outpatient    1955 MRN [id number]   Date of Exam 18 PCP Sohail Fierro MD           Spirometry   FEV1:2.67 140%  FVC:2.83 119%  FEV1/FVC:0.94    Lung Volume   T.

## 2018-12-26 NOTE — ADDENDUM NOTE
Encounter addended by: Vanessa Wong DO on: 12/26/2018 3:03 PM   Actions taken: Sign clinical note, Charge Capture section accepted

## 2018-12-26 NOTE — TELEPHONE ENCOUNTER
Daughter stating that patient is still having nausea and dizziness that began on 12.21.18 since she was switched to verapamil.  Daughter states patient saw Alex Bunch on 12.24.18 for same symptoms she is having now and symptoms have not worsened but al

## 2018-12-26 NOTE — TELEPHONE ENCOUNTER
The daughter was notified. She will call us back with the blood pressure. The patient stated the Verapamil on 12/21/18.

## 2018-12-26 NOTE — ASSESSMENT & PLAN NOTE
Supportive care discussed    Refill zofran 4 mg I po tid prn    Please call if symptoms worsen or are not resolving.

## 2018-12-27 NOTE — TELEPHONE ENCOUNTER
Prior authorization needed for:    •  Glucose Blood (ANNEL CONTOUR NEXT TEST) In Vitro Strip, TEST THREE TIMES DAILY AS DIRECTED, Disp: 100 strip, Rfl: 2    Plan does not cover this medication.  Please call plan at [phone] to initiate prior authorizati

## 2018-12-27 NOTE — TELEPHONE ENCOUNTER
PA for Prince test strips completed with [employer] via CMM response time 3-5 business days [id number] [facility].

## 2019-01-01 ENCOUNTER — OFFICE VISIT (OUTPATIENT)
Dept: NEUROLOGY | Facility: CLINIC | Age: 64
End: 2019-01-01
Payer: MEDICAID

## 2019-01-01 ENCOUNTER — LAB ENCOUNTER (OUTPATIENT)
Dept: LAB | Age: 64
End: 2019-01-01
Attending: FAMILY MEDICINE
Payer: MEDICAID

## 2019-01-01 ENCOUNTER — TELEPHONE (OUTPATIENT)
Dept: NEUROLOGY | Facility: CLINIC | Age: 64
End: 2019-01-01

## 2019-01-01 ENCOUNTER — TELEPHONE (OUTPATIENT)
Dept: FAMILY MEDICINE CLINIC | Facility: CLINIC | Age: 64
End: 2019-01-01

## 2019-01-01 ENCOUNTER — APPOINTMENT (OUTPATIENT)
Dept: PHYSICAL THERAPY | Facility: HOSPITAL | Age: 64
End: 2019-01-01
Attending: Other
Payer: MEDICAID

## 2019-01-01 ENCOUNTER — APPOINTMENT (OUTPATIENT)
Dept: LAB | Age: 64
End: 2019-01-01
Attending: FAMILY MEDICINE
Payer: MEDICAID

## 2019-01-01 ENCOUNTER — OFFICE VISIT (OUTPATIENT)
Dept: FAMILY MEDICINE CLINIC | Facility: CLINIC | Age: 64
End: 2019-01-01
Payer: MEDICAID

## 2019-01-01 ENCOUNTER — NURSE TRIAGE (OUTPATIENT)
Dept: FAMILY MEDICINE CLINIC | Facility: CLINIC | Age: 64
End: 2019-01-01

## 2019-01-01 ENCOUNTER — OFFICE VISIT (OUTPATIENT)
Dept: PULMONOLOGY | Facility: CLINIC | Age: 64
End: 2019-01-01
Payer: MEDICAID

## 2019-01-01 VITALS
HEIGHT: 60 IN | HEART RATE: 60 BPM | WEIGHT: 136 LBS | RESPIRATION RATE: 16 BRPM | DIASTOLIC BLOOD PRESSURE: 60 MMHG | BODY MASS INDEX: 26.7 KG/M2 | SYSTOLIC BLOOD PRESSURE: 120 MMHG

## 2019-01-01 VITALS
OXYGEN SATURATION: 97 % | HEART RATE: 75 BPM | SYSTOLIC BLOOD PRESSURE: 123 MMHG | DIASTOLIC BLOOD PRESSURE: 71 MMHG | WEIGHT: 136 LBS | BODY MASS INDEX: 26.7 KG/M2 | HEIGHT: 60 IN

## 2019-01-01 VITALS
TEMPERATURE: 98 F | WEIGHT: 150 LBS | SYSTOLIC BLOOD PRESSURE: 151 MMHG | HEART RATE: 64 BPM | DIASTOLIC BLOOD PRESSURE: 77 MMHG | BODY MASS INDEX: 29 KG/M2 | RESPIRATION RATE: 20 BRPM

## 2019-01-01 VITALS
WEIGHT: 136.69 LBS | SYSTOLIC BLOOD PRESSURE: 134 MMHG | TEMPERATURE: 98 F | HEART RATE: 60 BPM | DIASTOLIC BLOOD PRESSURE: 73 MMHG | BODY MASS INDEX: 27 KG/M2

## 2019-01-01 DIAGNOSIS — G20 PARKINSON DISEASE (HCC): Primary | ICD-10-CM

## 2019-01-01 DIAGNOSIS — K74.60 CIRRHOSIS OF LIVER NOT DUE TO ALCOHOL (HCC): ICD-10-CM

## 2019-01-01 DIAGNOSIS — R18.8 CIRRHOSIS OF LIVER WITH ASCITES, UNSPECIFIED HEPATIC CIRRHOSIS TYPE (HCC): Primary | ICD-10-CM

## 2019-01-01 DIAGNOSIS — Z00.00 ANNUAL PHYSICAL EXAM: ICD-10-CM

## 2019-01-01 DIAGNOSIS — Z12.31 VISIT FOR SCREENING MAMMOGRAM: ICD-10-CM

## 2019-01-01 DIAGNOSIS — K74.60 CIRRHOSIS OF LIVER WITH ASCITES, UNSPECIFIED HEPATIC CIRRHOSIS TYPE (HCC): Primary | ICD-10-CM

## 2019-01-01 DIAGNOSIS — A08.4 VIRAL GASTROENTERITIS: ICD-10-CM

## 2019-01-01 DIAGNOSIS — K74.60 CIRRHOSIS OF LIVER WITHOUT ASCITES, UNSPECIFIED HEPATIC CIRRHOSIS TYPE (HCC): ICD-10-CM

## 2019-01-01 DIAGNOSIS — E87.1 HYPONATREMIA: ICD-10-CM

## 2019-01-01 DIAGNOSIS — R18.8 CIRRHOSIS OF LIVER WITH ASCITES, UNSPECIFIED HEPATIC CIRRHOSIS TYPE (HCC): ICD-10-CM

## 2019-01-01 DIAGNOSIS — G20 PARKINSON DISEASE (HCC): ICD-10-CM

## 2019-01-01 DIAGNOSIS — G25.81 RLS (RESTLESS LEGS SYNDROME): ICD-10-CM

## 2019-01-01 DIAGNOSIS — G47.33 OSA (OBSTRUCTIVE SLEEP APNEA): ICD-10-CM

## 2019-01-01 DIAGNOSIS — R05.9 COUGH: Primary | ICD-10-CM

## 2019-01-01 DIAGNOSIS — J43.9 PULMONARY EMPHYSEMA, UNSPECIFIED EMPHYSEMA TYPE (HCC): ICD-10-CM

## 2019-01-01 DIAGNOSIS — Z00.00 ANNUAL PHYSICAL EXAM: Primary | ICD-10-CM

## 2019-01-01 DIAGNOSIS — R76.11 POSITIVE TB TEST: ICD-10-CM

## 2019-01-01 DIAGNOSIS — G47.61 PLMD (PERIODIC LIMB MOVEMENT DISORDER): ICD-10-CM

## 2019-01-01 DIAGNOSIS — E11.65 UNCONTROLLED TYPE 2 DIABETES MELLITUS, WITHOUT LONG-TERM CURRENT USE OF INSULIN (HCC): Primary | ICD-10-CM

## 2019-01-01 DIAGNOSIS — E55.9 VITAMIN D DEFICIENCY: ICD-10-CM

## 2019-01-01 DIAGNOSIS — R76.11 POSITIVE TB TEST: Primary | ICD-10-CM

## 2019-01-01 DIAGNOSIS — K74.60 CIRRHOSIS OF LIVER WITH ASCITES, UNSPECIFIED HEPATIC CIRRHOSIS TYPE (HCC): ICD-10-CM

## 2019-01-01 DIAGNOSIS — I10 ESSENTIAL HYPERTENSION: ICD-10-CM

## 2019-01-01 DIAGNOSIS — E11.9 TYPE 2 DIABETES MELLITUS WITHOUT COMPLICATION, WITHOUT LONG-TERM CURRENT USE OF INSULIN (HCC): ICD-10-CM

## 2019-01-01 DIAGNOSIS — R27.0 ATAXIA: ICD-10-CM

## 2019-01-01 DIAGNOSIS — L57.8 SOLAR DERMATITIS: ICD-10-CM

## 2019-01-01 PROCEDURE — 83036 HEMOGLOBIN GLYCOSYLATED A1C: CPT

## 2019-01-01 PROCEDURE — 82140 ASSAY OF AMMONIA: CPT

## 2019-01-01 PROCEDURE — 85025 COMPLETE CBC W/AUTO DIFF WBC: CPT

## 2019-01-01 PROCEDURE — 99212 OFFICE O/P EST SF 10 MIN: CPT | Performed by: INTERNAL MEDICINE

## 2019-01-01 PROCEDURE — 80061 LIPID PANEL: CPT

## 2019-01-01 PROCEDURE — 82043 UR ALBUMIN QUANTITATIVE: CPT

## 2019-01-01 PROCEDURE — 82570 ASSAY OF URINE CREATININE: CPT

## 2019-01-01 PROCEDURE — 90471 IMMUNIZATION ADMIN: CPT | Performed by: FAMILY MEDICINE

## 2019-01-01 PROCEDURE — 80053 COMPREHEN METABOLIC PANEL: CPT

## 2019-01-01 PROCEDURE — 99214 OFFICE O/P EST MOD 30 MIN: CPT | Performed by: INTERNAL MEDICINE

## 2019-01-01 PROCEDURE — 36415 COLL VENOUS BLD VENIPUNCTURE: CPT

## 2019-01-01 PROCEDURE — 99213 OFFICE O/P EST LOW 20 MIN: CPT | Performed by: OTHER

## 2019-01-01 PROCEDURE — 82306 VITAMIN D 25 HYDROXY: CPT

## 2019-01-01 PROCEDURE — 99214 OFFICE O/P EST MOD 30 MIN: CPT | Performed by: OTHER

## 2019-01-01 PROCEDURE — 84443 ASSAY THYROID STIM HORMONE: CPT

## 2019-01-01 PROCEDURE — 99212 OFFICE O/P EST SF 10 MIN: CPT | Performed by: FAMILY MEDICINE

## 2019-01-01 PROCEDURE — 90662 IIV NO PRSV INCREASED AG IM: CPT | Performed by: FAMILY MEDICINE

## 2019-01-01 PROCEDURE — 99215 OFFICE O/P EST HI 40 MIN: CPT | Performed by: FAMILY MEDICINE

## 2019-01-01 PROCEDURE — 99397 PER PM REEVAL EST PAT 65+ YR: CPT | Performed by: FAMILY MEDICINE

## 2019-01-01 RX ORDER — ESCITALOPRAM OXALATE 10 MG/1
10 TABLET ORAL DAILY
Qty: 90 TABLET | Refills: 3 | OUTPATIENT
Start: 2019-01-01

## 2019-01-01 RX ORDER — LOSARTAN POTASSIUM 50 MG/1
TABLET ORAL
Qty: 90 TABLET | Refills: 1 | Status: SHIPPED | OUTPATIENT
Start: 2019-01-01 | End: 2019-01-01

## 2019-01-01 RX ORDER — ROPINIROLE 1 MG/1
1 TABLET, FILM COATED ORAL 3 TIMES DAILY
Qty: 270 TABLET | Refills: 1 | Status: SHIPPED | OUTPATIENT
Start: 2019-01-01 | End: 2019-01-01

## 2019-01-01 RX ORDER — ESCITALOPRAM OXALATE 10 MG/1
10 TABLET ORAL DAILY
Qty: 90 TABLET | Refills: 3 | Status: SHIPPED | OUTPATIENT
Start: 2019-01-01 | End: 2019-01-01

## 2019-01-01 RX ORDER — ESCITALOPRAM OXALATE 10 MG/1
10 TABLET ORAL DAILY
Qty: 90 TABLET | Refills: 3 | Status: SHIPPED | OUTPATIENT
Start: 2019-01-01

## 2019-01-01 RX ORDER — ROPINIROLE 1 MG/1
1 TABLET, FILM COATED ORAL 3 TIMES DAILY
Qty: 270 TABLET | Refills: 3 | Status: SHIPPED | OUTPATIENT
Start: 2019-01-01 | End: 2019-01-01

## 2019-01-01 RX ORDER — BLOOD SUGAR DIAGNOSTIC
STRIP MISCELLANEOUS
Qty: 100 STRIP | Refills: 3 | Status: CANCELLED | OUTPATIENT
Start: 2019-01-01

## 2019-01-01 RX ORDER — ALENDRONATE SODIUM 70 MG/1
70 TABLET ORAL WEEKLY
Qty: 12 TABLET | Refills: 1 | Status: CANCELLED | OUTPATIENT
Start: 2019-01-01

## 2019-01-01 RX ORDER — METFORMIN HYDROCHLORIDE 500 MG/1
500 TABLET, EXTENDED RELEASE ORAL
Qty: 90 TABLET | Refills: 3 | Status: SHIPPED | OUTPATIENT
Start: 2019-01-01

## 2019-01-01 RX ORDER — ROPINIROLE 0.25 MG/1
TABLET, FILM COATED ORAL
Qty: 270 TABLET | Refills: 0 | OUTPATIENT
Start: 2019-01-01

## 2019-01-01 RX ORDER — ONDANSETRON 4 MG/1
TABLET, FILM COATED ORAL
Qty: 30 TABLET | Refills: 0 | Status: SHIPPED | OUTPATIENT
Start: 2019-01-01

## 2019-01-01 RX ORDER — METOCLOPRAMIDE 5 MG/1
5 TABLET ORAL
Qty: 90 TABLET | Refills: 5 | Status: SHIPPED | OUTPATIENT
Start: 2019-01-01

## 2019-01-01 RX ORDER — ONDANSETRON 4 MG/1
TABLET, FILM COATED ORAL
Qty: 30 TABLET | Refills: 0 | Status: SHIPPED | OUTPATIENT
Start: 2019-01-01 | End: 2019-01-01

## 2019-01-01 RX ORDER — ROPINIROLE 1 MG/1
TABLET, FILM COATED ORAL
Qty: 315 TABLET | Refills: 3 | Status: SHIPPED | OUTPATIENT
Start: 2019-01-01

## 2019-01-01 RX ORDER — IPRATROPIUM BROMIDE 17 UG/1
AEROSOL, METERED RESPIRATORY (INHALATION)
Qty: 38.7 G | Refills: 11 | Status: SHIPPED | OUTPATIENT
Start: 2019-01-01

## 2019-01-01 RX ORDER — ERGOCALCIFEROL 1.25 MG/1
50000 CAPSULE ORAL WEEKLY
Qty: 12 CAPSULE | Refills: 3 | Status: SHIPPED | OUTPATIENT
Start: 2019-01-01 | End: 2019-01-01

## 2019-01-01 RX ORDER — BLOOD-GLUCOSE METER
1 EACH MISCELLANEOUS
Qty: 1 KIT | Refills: 0 | Status: CANCELLED | OUTPATIENT
Start: 2019-01-01

## 2019-01-01 RX ORDER — LOSARTAN POTASSIUM 50 MG/1
TABLET ORAL
Qty: 90 TABLET | Refills: 0 | Status: SHIPPED | OUTPATIENT
Start: 2019-01-01 | End: 2019-01-01

## 2019-01-01 RX ORDER — ALENDRONATE SODIUM 70 MG/1
TABLET ORAL
Qty: 12 TABLET | Refills: 0 | Status: SHIPPED | OUTPATIENT
Start: 2019-01-01 | End: 2019-01-01

## 2019-01-02 NOTE — TELEPHONE ENCOUNTER
Please advise in regards to refill request. Thank You  Refill Protocol Appointment Criteria  · Appointment scheduled in the past 12 months or in the next 3 months  Recent Outpatient Visits            1 week ago Viral gastroenteritis    Lora Castro

## 2019-01-07 NOTE — TELEPHONE ENCOUNTER
Spoke with rep Julienne at [facility] to check the status of the PA. Medication denied, plan states patient must try and fail take a antidiabetic medication and there must be a paid claim documented within the past 90 days.  Informed rep patient is taking Metform

## 2019-02-05 NOTE — TELEPHONE ENCOUNTER
Daughter states patient no longer with nausea or dizziness but has reduced the dose of verapamil to half a pill because patient vomits if she takes a whole pill.  States her sister checks her blood pressure every day and she thinks it runs 130/80-90 but isn

## 2019-02-05 NOTE — TELEPHONE ENCOUNTER
I reordered it at lower dose 120 mg and as a tablet - maybe less GI upset.  Let me know in week if BP ok and or if nausea continues

## 2019-02-05 NOTE — TELEPHONE ENCOUNTER
Spoke with daughter Dhara Raza), advised Dr Jared Nielsen note and verbalized understanding. Note      I reordered it at lower dose 120 mg and as a tablet - maybe less GI upset.  Let me know in week if BP ok and or if nausea continues

## 2019-04-09 PROBLEM — G20.A1 PARKINSON DISEASE: Status: ACTIVE | Noted: 2019-01-01

## 2019-04-09 PROBLEM — G20 PARKINSON DISEASE (HCC): Status: ACTIVE | Noted: 2019-01-01

## 2019-04-09 NOTE — TELEPHONE ENCOUNTER
Spoke to pharmacist and clarified directions for requip 1 mg to be 1/2 tab morning and noon and 1 tab in evening. Per office note, patient to stay on same dose.

## 2019-04-09 NOTE — PROGRESS NOTES
Neurology follow-up visit     Referred By: Dr. Betancur ref. provider found    Chief Complaint: No chief complaint on file. HPI:     Adrianne Archer is a 75year old female, who presents for fatigue and sleepiness.   Apparently since beginning especially on the right side. Dose of ropinirole was increased slightly. Patient came back for the follow-up in April 2019. Is doing well in terms of her Parkinson's symptoms and the restless leg syndrome symptoms.   She was taking ropinirole half a mi daily. 1/2 pill in am and noon and 1 pill at night, Disp: 270 tablet, Rfl: 1  •  Fluticasone-Umeclidin-Vilant (TRELEGY ELLIPTA) 100-62.5-25 MCG/INH Inhalation Aerosol Powder, Breath Activated, Inhale 1 puff into the lungs daily. , Disp: 3 each, Rfl: 0  •  a and corneal reflexes intact  VII. Face symmetric, no facial weakness. Mild masked facies  VIII. Hearing intact to whisper, tuning fork or finger rub. IX. Pallet elevates symmetrically. XI. Shoulder shrug is intact  XII.  Tongue is midline    Motor Exam: same dose of ropinirole. In the meantime patient seems to be developing some depression symptoms, therefore the dose of escitalopram will be increased to 10 mg daily. 2. RLS (restless legs syndrome)  Continue with the same dose of ropinirole    3.   Dys

## 2019-04-09 NOTE — PATIENT INSTRUCTIONS
Caregiver Resources:    [facility] ([facility]):     of Senior Services: Mina Byrd   Phone:   [phone] or  [phone]  Fax:   [phone]  TDD:   [phone]  Office Hours:   8 a.m. - 4:30 p.m.   Monday -

## 2019-04-09 NOTE — PROGRESS NOTES
Gets very tired. Has diarrhea due to meds    htn the verapamil made her dizzy   She has been taking 1/2 tab.     Saw neuro for parkinson's  Doing as good as possible with her liver and fatigue restrictions    Had very good sugars recently  Had dm for 6 yea

## 2019-04-10 NOTE — PROGRESS NOTES
HPI:    Patient ID: Joleen Lewis is a 75year old female.     HPI      She is completely asymptomatic with no cough or chest pain or shortness of breath or sputum or hemoptysis  Cough completely stopped after she stopped taking lisinopril  N daily. Disp: 30 tablet Rfl: 2   Fluticasone-Salmeterol (AIRDUO RESPICLICK 252/89) 854-42 MCG/ACT Inhalation Aerosol Powder, Breath Activated Inhale 1 puff into the lungs 2 (two) times daily.  Disp: 1 each Rfl: 3   ergocalciferol 78161 units Oral Cap Take 50

## 2019-04-15 NOTE — TELEPHONE ENCOUNTER
Talked with the daughter, her mom is taking  Ropinirole. She said that her mom is taking,   1 tablet three times daily. 1 in the am, 1 in the afternoon and 1 in pm.  The mom is doing good with this dosage.      Medicine called for  1/2 pill in am and noon

## 2019-04-15 NOTE — TELEPHONE ENCOUNTER
[facility] of Ropinerol 1mg TID.     Patient verbalized understanding

## 2019-04-25 NOTE — TELEPHONE ENCOUNTER
[employer] sent letter RE: Potential maintenance therapy duplication. Letter placed in the inbox of Dr Celia Salamanca for review.

## 2019-05-23 NOTE — TELEPHONE ENCOUNTER
EG please advise on instructions for testing, test strips and lancets pended Responded to pt, will route new order to MD Nikki Andrea on 5/23/2019 at 10:11 AM      5/23/19  Order signed  Coleen Smith MD, PhD

## 2019-07-09 NOTE — PROGRESS NOTES
Neurology follow-up visit     Referred By: Dr. Betancur ref. provider found    Chief Complaint: Patient presents with:  Parkinson's: Patient here for follow up for PD. Family here states patient is improving with her movements, also her RLS.  Does still have sl Tremors were getting worse as well, more so in the legs. Patient came back for the follow-up and December 2018, her sleep is still quite better but still had some tremors during the daytime especially on the right side.   Dose of ropinirole was increased (70 mg total) by mouth once a week., Disp: 12 tablet, Rfl: 0  •  furosemide 40 MG Oral Tab, Take 1 tablet (40 mg total) by mouth daily. , Disp: 30 tablet, Rfl: 2  •  ergocalciferol 02621 units Oral Cap, Take 50,000 Units by mouth every 7 days. , Disp: , Rfl: increased on both sides, cogwheel rigidity type.   Some tremor in both legs intermittently was noted at rest  Bradykinesia  No atrophy or fasciculations  Strength- upper extremities 5/5 proximally and distally                  - lower  extremities 5/5 proxi seek medical attention immediately if symptoms worsen. Patient verbalized understanding of information given. All questions were answered. All side effects of drugs were discussed. Return to clinic in: Return in about 6 months (around 1/9/2020).     Ar

## 2019-07-30 NOTE — TELEPHONE ENCOUNTER
Refill passed per [place] [facility], [facility] protocol. However med module has pt takes 0.5 tablets daily. pls confirm.     Requested Prescriptions   Pending Prescriptions Disp Refills   • VERAPAMIL HCL  MG Oral Tab CR [Pharmacy Med Name: VERAPAMIL ER 120MG TABLE

## 2019-08-05 NOTE — TELEPHONE ENCOUNTER
Refill passed per [facility], [facility] protocol.   Refill Protocol Appointment Criteria  · Appointment scheduled in the past 12 months or in the next 3 months  Recent Outpatient Visits            3 weeks ago Parkinson disease [facility])    [address] [facility]

## 2019-09-15 NOTE — TELEPHONE ENCOUNTER
Review pended refill request as it does not fall under a protocol.   Requested Prescriptions     Pending Prescriptions Disp Refills   • Metoclopramide HCl 5 MG Oral Tab 90 tablet 5     Sig: Take 1 tablet (5 mg total) by mouth 3 (three) times daily before me

## 2019-09-15 NOTE — TELEPHONE ENCOUNTER
LR 1-2-19 # 30      Refill Protocol Appointment Criteria  · Appointment scheduled in the past 12 months or in the next 3 months  Recent Outpatient Visits            2 months ago Parkinson disease [facility])    RAS Harrington Handler

## 2019-09-16 NOTE — TELEPHONE ENCOUNTER
Refill passed per [facility], [facility] protocol.   Refill Protocol Appointment Criteria  · Appointment scheduled in the past 12 months or in the next 3 months  Recent Outpatient Visits            2 months ago Parkinson disease ([facility] [address]    [facility], [address] visit    In 3 months MD RAS Linda 6 month fu        Lab Results   Component Value Date    GLU 95 04/12/2019    BUN 10 04/12/2019    CREATSERUM 0.73 04/12/201

## 2019-09-17 NOTE — TELEPHONE ENCOUNTER
Spoke with Dinora Bazan, daughter (on [address]),  Daughter is not clear on whether patient needs this or not, so she will visit her mother and check with her about her medications and get back to us by phone or by [address]    Sofia Farnsworth #[id number]  Ph

## 2019-09-19 NOTE — TELEPHONE ENCOUNTER
Confirmed pt has 10 pills left and doesn't need refill on alendronate 70 mg. Will send pharmacy message when she needs refills.

## 2019-10-22 NOTE — PROGRESS NOTES
Daughter reports never had problems with blood sugar and never had diabetes. REASON FOR VISIT:    Alba Pereyra is a 75year old female who presents for an Annual Health Assessment. colonsopy doen last year pr daughter.     Patient Acti C Screening Screen pts at high risk plus screen one time for adults born [address] No results found for: HCVAB   Tuberculosis Screen If high risk No components found for: [address] Internal Lab or Procedure     Annual M 1 tablet (40 mg total) by mouth daily. (Patient taking differently: Take 60 mg by mouth daily.  ), Disp: 30 tablet, Rfl: 2  ergocalciferol 83701 units Oral Cap, Take 50,000 Units by mouth every 7 days. , Disp: , Rfl:   lactulose 10 GM/15ML Oral Solution, Ta clear. normal optic disc  NECK: supple, no adenopathy, no bruits  CHEST: no chest tenderness  BREAST: deferred   LUNGS: clear to auscultation  CARDIO: RRR without murmur  GI: good BS's, no masses, HSM or tenderness  :deferred  RECTAL: deferred  MUSCULOSK

## 2019-10-25 NOTE — TELEPHONE ENCOUNTER
Language line #[id number]    Spoke with daughter Matthew Gonzalez ( and MERRILL verified) and relayed Dr. Willard Richardson' message below--she states she did view lab results via [address] and is concerned because the HgbA1C is much higher than previously (6.1 on 19)--aski

## 2019-10-25 NOTE — TELEPHONE ENCOUNTER
Language Line # #[id number]    Spoke with daughter (MERRILL verified) and relayed Dr. Akin Rodriges' message below--she verbalizes understanding and agreement. No further questions/concerns at this time. DM Panel and Metformin ordered as per Dr. Akin Rodriges.

## 2019-10-25 NOTE — TELEPHONE ENCOUNTER
Was on metformin 500mg er 1 po q d #90 with 3 refills  Recheck dm panel in 3 months. Dx dm 2 no insulin uncontrolled.

## 2019-11-18 NOTE — TELEPHONE ENCOUNTER
Patients daughter states mother was prescribed medication, metFORMIN HCl  MG Oral Tablet 24 Hr, says medication is making her feel weak and lowering her sugar levels. Wants appointment for Friday. Nothing available.  Patient Is currently out of town

## 2019-11-18 NOTE — TELEPHONE ENCOUNTER
Action Requested: Summary for Provider     []  Critical Lab, Recommendations Needed  [x] Need Additional Advice  [x]   FYI    []   Need Orders  [] Need Medications Sent to Pharmacy  []  Other     SUMMARY: Daughter following up, patient is currently in [facility]

## 2019-11-23 NOTE — TELEPHONE ENCOUNTER
With  Lia Arora ID# [id number]. Daughter states she took her mother to [facility] and found to have a blood clot in her liver, and diagnosed with cancer and informed \"there's nothing else to do. \"  Daughter states yesterday her mother was flown to [place]

## 2019-11-23 NOTE — TELEPHONE ENCOUNTER
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   She probably isn't hung

## 2020-06-24 ENCOUNTER — TELEPHONE (OUTPATIENT)
Dept: FAMILY MEDICINE CLINIC | Facility: CLINIC | Age: 65
End: 2020-06-24

## 2021-08-06 NOTE — LETTER
Patient Name: Carlotta Orozco  YOB: 1955          MRN number:  [id number]  Date:  11/8/2018  Referring Physician: Jian Howard     ADULT SWALLOW/SPEECH/LANGUAGE/COGNITIVE  EVALUATION:    Referring Physician: Dr. Kailee Gordon  Diagnosis pallidus) bilaterally. This can be seen in the setting of metabolic dysfunction such as liver disease/hyperammonemia. 4. Mild presents sequelae of chronic microangiopathy. 5. 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   6. Mild right max reduced language skills. Pt unable to provide her date of birth, age, and/or address. Pt with limited ability to respond to simple questions following a short story and/or recall simple details from a short story. Pt with impaired intact generative naming. accuracy provided multi-modal cues. Frequency / Duration: Patient will be seen for 1 x/week or a total of 12 visits over a 90 day period.  Treatment will include: Speech Therapy/Dysphagia therapy     Education or treatment limitation: Cognition and Comm right radial c/d/i

## 2021-08-18 NOTE — TELEPHONE ENCOUNTER
----- Message from Armenta Canavan, MD sent at 11/6/2018  8:52 AM CST -----  Please let patient know that MRI brain didn't show strokes or tumors, but there were some changes related to her liver disease, possibly playing a role in some of her parkinso
LMTCB.
Pt's daughter returning Toya's call.  Would like a call back
Spoke to patient's daughter and notified her of below. She was understanding.
DISPLAY PLAN FREE TEXT
Yes

## 2024-05-19 NOTE — TELEPHONE ENCOUNTER
EVELIOTCB with daughter. Please follow up with the primary care physician within 1 week.  Continue to check your blood sugars and take insulin as prescribed    Return to the ER for any headaches, weakness or numbness to arms or legs, slurred speech, or any other concerns

## 2024-07-18 NOTE — TELEPHONE ENCOUNTER
LMTCB-ext 51099 today only examination under anesthesia cold knife biopsy with endometrial curettage

## 2025-03-11 NOTE — TELEPHONE ENCOUNTER
3/11/2025-mild amblyopia left eye.  This is secondary to a combination of intermittent exotropia as well as anisometropic astigmatism.  Will begin part-time patching right eye 2 hours/day   Dr. Marquis Schmidt is made aware of below communication with pt.'s daughter.

## (undated) NOTE — MR AVS SNAPSHOT
Daniela  [address]  [phone]               Thank you for choosing us for your health care visit with Chad England MD.  We are glad to serve you and happy to provide you with this summary schedule your appointment. If you are confident that your benefit plan will not require a referral or authorization, such as [place] Medicaid, please feel free to schedule your appointment immediately.  However, if you are unsure about the requirements [address], [phone], [address], [address] [facility] [address]     Phone:  [phone]    - furosemide 20 MG Tabs  - MetFORMIN HCl 500 MG Tabs  - spironolactone 25 MG Tabs            MyChart     Sig

## (undated) NOTE — LETTER
SPEECH/SWALLOW THERAPY DISCHARGE SUMMARY     Diagnosis: Dysphagia;Cognitive-Linguistic   Authorized # of Visits:  12       Next MD visit: none scheduled  Fall Risk: standard         Precautions: n/a           Medication Changes since last visit?: No  Tally Pour throat clear, immediate/delayed cough, wet vocal quality, increased O2 effort) observed across all trials. D/t the degenerative nature of PD, please continue exercises 2-3x daily.  At this time, d/c skilled swallowing services per pt's request. Pt is travel 3. Pt will respond to simple y/n questions following a short paragraph with 90% accuracy provided multi-modal cues. Not met ad/t self-discharge    4. Pt will name at least 6 items provided a simple category with 90% accuracy provided multi-modal cues.  MET

## (undated) NOTE — LETTER
04/22/20        [address] [facility] [address]      Dear Scott Ruiz,    Our records indicate that you have outstanding lab work and or testing that was ordered for you and has not yet been completed:  Vivian Zamarripa

## (undated) NOTE — MR AVS SNAPSHOT
[place] [facility]. [address] [place] [address]  [address]  [address]  [phone]               Thank you for choosing us for your health care visit with Marilia Richards MD.  We are glad to serve you and happy to provide you with this summary of yo authorization, such as [place], please feel free to schedule your appointment immediately. However, if you are unsure about the requirements for authorization, please wait 5-7 days and then contact your physician's office.  At that time, you will Enter your [employer] Activation Code exactly as it appears below along with your Zip Code and Date of Birth to complete the sign-up process. If you do not sign up before the expiration date, you must request a new code.     Your unique Graspr Access Code: [email or web address]

## (undated) NOTE — MR AVS SNAPSHOT
[address], [place]  [address] [place] [address]  [address]  [phone]               Thank you for choosing us for your health care visit with Jyoti Green MD.  We are glad to serve you and happy to provide you with this summary of yo Vitamin D3 88314 units Caps   Take by mouth.                 Where to Get Your Medications      These medications were sent to [address], [phone], [phone] [address]

## (undated) NOTE — MR AVS SNAPSHOT
[facility]  [address] [facility] [address] [place] [address] [phone]               Thank you for choosing us for your health care visit with Jatinder Carroll MD.  We are glad to serve you and happy to provide you with this summary of yo Vitamin D3 91815 units Caps   Take by mouth.                 Where to Get Your Medications      These medications were sent to [address], [phone], [phone] [address]

## (undated) NOTE — LETTER
02/25/20      [address] [place] [id number]      Dear Carla Goodpasture,    Our records indicate that you have outstanding lab work and or testing that was ordered for you and has not yet been completed:  Orders Placed This

## (undated) NOTE — MR AVS SNAPSHOT
Daniela  [address]  [phone]               Thank you for choosing us for your health care visit with Miko Viramontes MD.  We are glad to serve you and happy to provide you with this summary your appointment immediately. However, if you are unsure about the requirements for authorization, please wait 5-7 days and then contact your physician's office.  At that time, you will be provided with any authorization numbers or be assured that none are Enter your [employer] Activation Code exactly as it appears below along with your Zip Code and Date of Birth to complete the sign-up process. If you do not sign up before the expiration date, you must request a new code.     Your unique [employer] Access Code: [email or web address] Eat plenty of protein, keep the fat content low Sugars:  sodas and sports drinks, candies and desserts   Eat plenty of low-fat dairy products High fat meats and dairy   Choose whole grain products Foods high in sodium   Water is best for hydration Fast elsie